# Patient Record
Sex: MALE | Race: WHITE | Employment: OTHER | URBAN - METROPOLITAN AREA
[De-identification: names, ages, dates, MRNs, and addresses within clinical notes are randomized per-mention and may not be internally consistent; named-entity substitution may affect disease eponyms.]

---

## 2021-10-06 ENCOUNTER — OFFICE VISIT (OUTPATIENT)
Dept: INTERNAL MEDICINE CLINIC | Age: 71
End: 2021-10-06
Payer: MEDICARE

## 2021-10-06 VITALS
HEIGHT: 71 IN | SYSTOLIC BLOOD PRESSURE: 130 MMHG | DIASTOLIC BLOOD PRESSURE: 80 MMHG | OXYGEN SATURATION: 97 % | HEART RATE: 77 BPM | WEIGHT: 216.4 LBS | BODY MASS INDEX: 30.3 KG/M2

## 2021-10-06 DIAGNOSIS — N18.30 STAGE 3 CHRONIC KIDNEY DISEASE, UNSPECIFIED WHETHER STAGE 3A OR 3B CKD (HCC): ICD-10-CM

## 2021-10-06 DIAGNOSIS — I10 PRIMARY HYPERTENSION: Primary | ICD-10-CM

## 2021-10-06 DIAGNOSIS — E53.8 B12 DEFICIENCY: ICD-10-CM

## 2021-10-06 DIAGNOSIS — N39.492 POSTURAL URINARY INCONTINENCE: ICD-10-CM

## 2021-10-06 DIAGNOSIS — E55.9 VITAMIN D DEFICIENCY: ICD-10-CM

## 2021-10-06 DIAGNOSIS — E78.00 HYPERCHOLESTEROLEMIA: ICD-10-CM

## 2021-10-06 DIAGNOSIS — Z13.220 SCREENING FOR CHOLESTEROL LEVEL: ICD-10-CM

## 2021-10-06 DIAGNOSIS — I48.21 PERMANENT ATRIAL FIBRILLATION (HCC): ICD-10-CM

## 2021-10-06 DIAGNOSIS — M17.11 PRIMARY OSTEOARTHRITIS OF RIGHT KNEE: ICD-10-CM

## 2021-10-06 DIAGNOSIS — C61 PROSTATE CANCER (HCC): ICD-10-CM

## 2021-10-06 PROBLEM — R26.9 GAIT DISTURBANCE: Status: ACTIVE | Noted: 2018-08-30

## 2021-10-06 PROBLEM — N39.46 MIXED STRESS AND URGE URINARY INCONTINENCE: Status: ACTIVE | Noted: 2018-02-23

## 2021-10-06 PROCEDURE — 3017F COLORECTAL CA SCREEN DOC REV: CPT | Performed by: INTERNAL MEDICINE

## 2021-10-06 PROCEDURE — 1036F TOBACCO NON-USER: CPT | Performed by: INTERNAL MEDICINE

## 2021-10-06 PROCEDURE — G8484 FLU IMMUNIZE NO ADMIN: HCPCS | Performed by: INTERNAL MEDICINE

## 2021-10-06 PROCEDURE — G8427 DOCREV CUR MEDS BY ELIG CLIN: HCPCS | Performed by: INTERNAL MEDICINE

## 2021-10-06 PROCEDURE — 1123F ACP DISCUSS/DSCN MKR DOCD: CPT | Performed by: INTERNAL MEDICINE

## 2021-10-06 PROCEDURE — 99204 OFFICE O/P NEW MOD 45 MIN: CPT | Performed by: INTERNAL MEDICINE

## 2021-10-06 PROCEDURE — G8417 CALC BMI ABV UP PARAM F/U: HCPCS | Performed by: INTERNAL MEDICINE

## 2021-10-06 PROCEDURE — 4040F PNEUMOC VAC/ADMIN/RCVD: CPT | Performed by: INTERNAL MEDICINE

## 2021-10-06 RX ORDER — LOSARTAN POTASSIUM 50 MG/1
50 TABLET ORAL 2 TIMES DAILY
COMMUNITY
Start: 2020-11-05 | End: 2021-10-06 | Stop reason: SDUPTHER

## 2021-10-06 RX ORDER — LORATADINE 10 MG/1
10 TABLET ORAL DAILY PRN
COMMUNITY
End: 2022-04-06

## 2021-10-06 RX ORDER — DILTIAZEM HYDROCHLORIDE 240 MG/1
240 CAPSULE, EXTENDED RELEASE ORAL DAILY
COMMUNITY
Start: 2021-01-20 | End: 2021-10-06 | Stop reason: SDUPTHER

## 2021-10-06 RX ORDER — CHLORTHALIDONE 25 MG/1
25 TABLET ORAL DAILY
COMMUNITY
Start: 2021-01-26 | End: 2021-10-06 | Stop reason: SDUPTHER

## 2021-10-06 RX ORDER — CARVEDILOL 12.5 MG/1
12.5 TABLET ORAL 2 TIMES DAILY WITH MEALS
Qty: 180 TABLET | Refills: 1 | Status: SHIPPED | OUTPATIENT
Start: 2021-10-06 | End: 2022-02-07 | Stop reason: SDUPTHER

## 2021-10-06 RX ORDER — CHLORTHALIDONE 25 MG/1
25 TABLET ORAL DAILY
Qty: 90 TABLET | Refills: 1 | Status: SHIPPED | OUTPATIENT
Start: 2021-10-06 | End: 2022-02-07 | Stop reason: SDUPTHER

## 2021-10-06 RX ORDER — ATORVASTATIN CALCIUM 20 MG/1
20 TABLET, FILM COATED ORAL NIGHTLY
COMMUNITY
Start: 2021-02-25 | End: 2021-10-06 | Stop reason: SDUPTHER

## 2021-10-06 RX ORDER — TOLTERODINE 4 MG/1
CAPSULE, EXTENDED RELEASE ORAL
COMMUNITY
Start: 2021-03-17 | End: 2021-10-06 | Stop reason: SDUPTHER

## 2021-10-06 RX ORDER — TOLTERODINE 4 MG/1
CAPSULE, EXTENDED RELEASE ORAL
Qty: 90 CAPSULE | Refills: 1 | Status: SHIPPED | OUTPATIENT
Start: 2021-10-06 | End: 2022-02-07 | Stop reason: SDUPTHER

## 2021-10-06 RX ORDER — ACETAMINOPHEN 500 MG
1000 TABLET ORAL 2 TIMES DAILY
COMMUNITY

## 2021-10-06 RX ORDER — DILTIAZEM HYDROCHLORIDE 240 MG/1
240 CAPSULE, EXTENDED RELEASE ORAL DAILY
COMMUNITY
End: 2021-10-06

## 2021-10-06 RX ORDER — CARVEDILOL 12.5 MG/1
12.5 TABLET ORAL 2 TIMES DAILY WITH MEALS
COMMUNITY
Start: 2021-01-20 | End: 2021-10-06 | Stop reason: SDUPTHER

## 2021-10-06 RX ORDER — LOSARTAN POTASSIUM 50 MG/1
50 TABLET ORAL 2 TIMES DAILY
Qty: 180 TABLET | Refills: 11 | Status: SHIPPED | OUTPATIENT
Start: 2021-10-06 | End: 2022-02-07 | Stop reason: SDUPTHER

## 2021-10-06 RX ORDER — POTASSIUM CHLORIDE 20 MEQ/1
20 TABLET, EXTENDED RELEASE ORAL 2 TIMES DAILY
COMMUNITY
Start: 2021-09-15 | End: 2022-02-16 | Stop reason: SDUPTHER

## 2021-10-06 RX ORDER — ATORVASTATIN CALCIUM 20 MG/1
20 TABLET, FILM COATED ORAL NIGHTLY
Qty: 90 TABLET | Refills: 1 | Status: SHIPPED | OUTPATIENT
Start: 2021-10-06 | End: 2022-02-07 | Stop reason: SDUPTHER

## 2021-10-06 RX ORDER — DILTIAZEM HYDROCHLORIDE 240 MG/1
240 CAPSULE, EXTENDED RELEASE ORAL DAILY
Qty: 90 CAPSULE | Refills: 1 | Status: SHIPPED | OUTPATIENT
Start: 2021-10-06 | End: 2022-02-07 | Stop reason: SDUPTHER

## 2021-10-06 RX ORDER — UBIDECARENONE 200 MG
200 CAPSULE ORAL DAILY
COMMUNITY

## 2021-10-06 SDOH — ECONOMIC STABILITY: FOOD INSECURITY: WITHIN THE PAST 12 MONTHS, THE FOOD YOU BOUGHT JUST DIDN'T LAST AND YOU DIDN'T HAVE MONEY TO GET MORE.: NEVER TRUE

## 2021-10-06 SDOH — ECONOMIC STABILITY: FOOD INSECURITY: WITHIN THE PAST 12 MONTHS, YOU WORRIED THAT YOUR FOOD WOULD RUN OUT BEFORE YOU GOT MONEY TO BUY MORE.: NEVER TRUE

## 2021-10-06 ASSESSMENT — PATIENT HEALTH QUESTIONNAIRE - PHQ9
SUM OF ALL RESPONSES TO PHQ9 QUESTIONS 1 & 2: 0
SUM OF ALL RESPONSES TO PHQ QUESTIONS 1-9: 0
1. LITTLE INTEREST OR PLEASURE IN DOING THINGS: 0
SUM OF ALL RESPONSES TO PHQ QUESTIONS 1-9: 0
SUM OF ALL RESPONSES TO PHQ QUESTIONS 1-9: 0
2. FEELING DOWN, DEPRESSED OR HOPELESS: 0

## 2021-10-06 ASSESSMENT — ENCOUNTER SYMPTOMS
SINUS PAIN: 0
COLOR CHANGE: 0
WHEEZING: 0
ABDOMINAL PAIN: 0
CHEST TIGHTNESS: 0
COUGH: 0
SHORTNESS OF BREATH: 0
BLOOD IN STOOL: 0
CONSTIPATION: 0
BLURRED VISION: 0

## 2021-10-06 ASSESSMENT — SOCIAL DETERMINANTS OF HEALTH (SDOH): HOW HARD IS IT FOR YOU TO PAY FOR THE VERY BASICS LIKE FOOD, HOUSING, MEDICAL CARE, AND HEATING?: NOT HARD AT ALL

## 2021-10-06 NOTE — PROGRESS NOTES
Tanesha Gonzalez (:  1950) is a 66 y.o. male,New patient, here for evaluation of the following chief complaint(s):  Established New Doctor         ASSESSMENT/PLAN:  1. Primary hypertension  Assessment & Plan:   Patient blood pressure slightly elevated at this point we will get monitor him clinically I think likely tends to do with his increased movement and excitement with his greatest visit we will also check his kidney function and refill his prescriptions today  Orders:  -     TSH without Reflex; Future  -     CBC Auto Differential; Future  -     Comprehensive Metabolic Panel; Future  2. Prostate cancer ([facility] [address]  3. Primary osteoarthritis of right knee  4. Permanent atrial fibrillation ([facility] [address]  5. Postural urinary incontinence  6. Screening for cholesterol level  -     Lipid Panel; Future  7. Vitamin D deficiency  -     VITAMIN D 25 HYDROXY; Future  8. B12 deficiency  -     VITAMIN B12 & FOLATE; Future  9. Hypercholesterolemia  Assessment & Plan: We will check patient with profile refill his prescriptions and continue same medication and we discussed the current parameters  10. Stage 3 chronic kidney disease, unspecified whether stage 3a or 3b CKD ([facility] [address]  Assessment & Plan: We will check patient kidney function test and see if any adjustment in treatment is needed      Return in about 6 months (around 2022) for For Medicare wellness visit. Subjective   SUBJECTIVE/OBJECTIVE:    Lab Review   No results found for: NA, K, CO2, BUN, CREATININE, GLUCOSE, CALCIUM  No results found for: WBC, HGB, HCT, MCV, PLT  No results found for: CHOL, TRIG, HDL, LDLDIRECT    Vitals 10/6/2021 755   SYSTOLIC 670 199   DIASTOLIC 80 88   Pulse - 77   SpO2 - 97   Weight - 216 lb 6.4 oz   Height - 5' 11\"   Body mass index - 30.18 kg/m2       Is here to establish himself    Hypertension  This is a chronic problem. The current episode started more than 1 year ago. The problem is unchanged. The problem is controlled. Pertinent negatives include no anxiety, blurred vision, chest pain, headaches, palpitations, peripheral edema, PND, shortness of breath or sweats. Hyperlipidemia  This is a chronic problem. The current episode started more than 1 year ago. The problem is controlled. Pertinent negatives include no chest pain, myalgias or shortness of breath. Review of Systems   Constitutional: Negative for activity change, appetite change, fatigue and unexpected weight change. HENT: Negative for congestion, ear pain and sinus pain. Eyes: Negative for blurred vision. Respiratory: Negative for cough, chest tightness, shortness of breath and wheezing. Cardiovascular: Negative for chest pain, palpitations and PND. Gastrointestinal: Negative for abdominal pain, blood in stool and constipation. Endocrine: Negative for cold intolerance, heat intolerance and polyuria. Genitourinary: Negative for dysuria, frequency and urgency. Musculoskeletal: Negative for arthralgias and myalgias. Skin: Negative for color change and rash. Neurological: Negative for weakness and headaches. Hematological: Negative for adenopathy. Does not bruise/bleed easily. Psychiatric/Behavioral: Negative for agitation, dysphoric mood and sleep disturbance. Objective   Physical Exam  Vitals and nursing note reviewed. Constitutional:       General: He is not in acute distress. Appearance: Normal appearance. HENT:      Head: Normocephalic and atraumatic. Right Ear: Tympanic membrane normal.      Left Ear: Tympanic membrane normal.      Nose: Nose normal.   Eyes:      Extraocular Movements: Extraocular movements intact. Conjunctiva/sclera: Conjunctivae normal.      Pupils: Pupils are equal, round, and reactive to light. Neck:      Vascular: No carotid bruit. Cardiovascular:      Rate and Rhythm: Normal rate and regular rhythm. Pulses: Normal pulses. Heart sounds: No murmur heard.      Pulmonary: Effort: Pulmonary effort is normal. No respiratory distress. Breath sounds: Normal breath sounds. Abdominal:      General: Abdomen is flat. Bowel sounds are normal. There is no distension. Palpations: Abdomen is soft. Tenderness: There is no abdominal tenderness. Musculoskeletal:         General: No swelling, tenderness or deformity. Cervical back: Normal range of motion and neck supple. No rigidity or tenderness. Right lower leg: No edema. Left lower leg: No edema. Lymphadenopathy:      Cervical: No cervical adenopathy. Skin:     Coloration: Skin is not jaundiced. Findings: No bruising, erythema or lesion. Neurological:      General: No focal deficit present. Mental Status: He is alert and oriented to person, place, and time. Cranial Nerves: No cranial nerve deficit. Motor: No weakness. Gait: Gait normal.            This dictation was generated by voice recognition computer software. Although all attempts are made to edit the dictation for accuracy, there may be errors in the transcription that are not intended. An electronic signature was used to authenticate this note.     --Cara Whatley MD

## 2021-10-06 NOTE — ASSESSMENT & PLAN NOTE
We will check patient with profile refill his prescriptions and continue same medication and we discussed the current parameters

## 2021-10-07 DIAGNOSIS — I48.21 PERMANENT ATRIAL FIBRILLATION (HCC): Primary | ICD-10-CM

## 2021-10-07 DIAGNOSIS — I10 PRIMARY HYPERTENSION: Primary | ICD-10-CM

## 2021-10-27 PROBLEM — E78.5 HYPERLIPIDEMIA: Status: ACTIVE | Noted: 2019-03-21

## 2021-10-27 NOTE — PATIENT INSTRUCTIONS
May consider and A fib ablation if you are interested  No changes today,  Call us if you have any concerns  Follow up 1 year

## 2021-10-27 NOTE — ASSESSMENT & PLAN NOTE
Current Rhythm~ regular sinus  Rate controlled  ChadsVasc score~ 2  Current meds~ eliquis / diltiazem  Plan~ controlled. Discussed ablation. Not interested at this time.

## 2021-10-27 NOTE — ASSESSMENT & PLAN NOTE
/80 (Site: Left Upper Arm, Position: Sitting, Cuff Size: Medium Adult)   Pulse 77   Ht 5' 11\" (1.803 m)   Wt 214 lb (97.1 kg)   SpO2 97%   BMI 29.85 kg/m²   Meds~ coreg / hygroton / losartan   Plan~ well controlled

## 2021-10-27 NOTE — PROGRESS NOTES
[address]   Cardiac Evaluation      Patient: Pam Blancas  YOB: 1950         Chief Complaint   Patient presents with    Hypertension     No complaints     Atrial Fibrillation        Referring provider: Shilpa Metzger MD    History of Present Illness:   Pam Blancas is a 66 y.o. male here to establish care since moving to [place] from [facility]. pmh includes Htn, Afib, Hld, CKD, never smoked. He is a retired nurse. Today he is here to establish since moving. He was being treated for chronic A fib. He reports it is controlled with diltiazem. He can tell when he is in afib. He reports the last time was two weeks ago when he forgot his medication. He took the diltiazem and returned to a normal rhythm. With regard to medication therapy he/she has been compliant with prescribed regimen and has tolerated therapy to date. Past Medical History:   has a past medical history of Permanent atrial fibrillation ([facility] [address] Postural urinary incontinence, Primary hypertension, and Prostate cancer ([facility] [address] Surgical History:   has no past surgical history on file.      Current Outpatient Medications   Medication Sig Dispense Refill    vitamin D 25 MCG (1000 UT) CAPS Take 1,000 Units by mouth daily      coenzyme Q10 200 MG CAPS capsule Take 200 mg by mouth daily      cyanocobalamin 500 MCG tablet Take 1,000 mcg by mouth daily      loratadine (CLARITIN) 10 MG tablet Take 10 mg by mouth daily as needed      potassium chloride (KLOR-CON M) 20 MEQ extended release tablet Take 20 mEq by mouth 2 times daily      acetaminophen (TYLENOL) 500 MG tablet Take 1,000 mg by mouth 2 times daily      apixaban (ELIQUIS) 5 MG TABS tablet Take 1 tablet by mouth 2 times daily 180 tablet 1    atorvastatin (LIPITOR) 20 MG tablet Take 1 tablet by mouth nightly 90 tablet 1    losartan (COZAAR) 50 MG tablet Take 1 tablet by mouth 2 times daily 180 tablet 11    carvedilol (COREG) 12.5 MG tablet Take 1 tablet by mouth 2 times daily (with meals) 180 tablet 1    dilTIAZem (TIAZAC) 240 MG extended release capsule Take 1 capsule by mouth daily 90 capsule 1    chlorthalidone (HYGROTON) 25 MG tablet Take 1 tablet by mouth daily 90 tablet 1    tolterodine (DETROL LA) 4 MG extended release capsule TAKE 1 CAPSULE DAILY 90 capsule 1     No current facility-administered medications for this visit. Allergies:  Lisinopril, Pravachol [pravastatin], and Morphine     Social History:  Social History     Socioeconomic History    Marital status:      Spouse name: Not on file    Number of children: Not on file    Years of education: Not on file    Highest education level: Not on file   Occupational History    Not on file   Tobacco Use    Smoking status: Never Smoker    Smokeless tobacco: Never Used   Substance and Sexual Activity    Alcohol use: Never    Drug use: Never    Sexual activity: Not on file   Other Topics Concern    Not on file   Social History Narrative    Not on file     Social Determinants of Health     Financial Resource Strain: Low Risk     Difficulty of Paying Living Expenses: Not hard at all   Food Insecurity: No Food Insecurity    Worried About [address] in the Last Year: Never true    [facility] in the Last Year: Never true   Transportation Needs:     Lack of Transportation (Medical): Not on file    Lack of Transportation (Non-Medical):  Not on file   Physical Activity:     Days of Exercise per Week: Not on file    Minutes of Exercise per Session: Not on file   Stress:     Feeling of Stress : Not on file   Social Connections:     Frequency of Communication with Friends and Family: Not on file    Frequency of Social Gatherings with Friends and Family: Not on file    Attends Taoism Services: Not on file    Active Member of Clubs or Organizations: Not on file    Attends Club or Organization Meetings: Not on file    Marital Status: Not on file   Intimate Partner Violence:     Fear of Current or Ex-Partner: Not on file    Emotionally Abused: Not on file    Physically Abused: Not on file    Sexually Abused: Not on file   Housing Stability:     Unable to Pay for Housing in the Last Year: Not on file    Number of Places Lived in the Last Year: Not on file    Unstable Housing in the Last Year: Not on file       Family History:   History reviewed. No pertinent family history. Family history has been reviewed and not pertinent except as noted above. Review of Systems:   · Constitutional: there has been no unanticipated weight loss. No change in energy or activity level   · Eyes: No visual changes   · ENT: No Headaches, hearing loss or vertigo. No mouth sores or sore throat. · Cardiovascular: Reviewed in HPI  · Respiratory: No cough or wheezing, no sputum production. · Gastrointestinal: No abdominal pain, appetite loss, blood in stools. No change in bowel or bladder habits. · Genitourinary: No nocturia, dysuria, trouble voiding  · Musculoskeletal:  No gait disturbance, weakness or joint complaints. · Integumentary: No rash or pruritis. · Neurological: No headache, change in muscle strength, numbness or tingling. No change in gait, balance, coordination, mood, affect, memory, mentation, behavior. · Psychiatric: No anxiety or depression  · Endocrine: No malaise or fever  · Hematologic/Lymphatic: No abnormal bruising or bleeding, blood clots or swollen lymph nodes. · Allergic/Immunologic: No nasal congestion or hives. Physical Examination:    Vitals:    11/11/21 1329   BP: 138/80   Site: Left Upper Arm   Position: Sitting   Cuff Size: Medium Adult   Pulse: 77   SpO2: 97%   Weight: 214 lb (97.1 kg)   Height: 5' 11\" (1.803 m)     Body mass index is 29.85 kg/m².      Wt Readings from Last 3 Encounters:   11/11/21 214 lb (97.1 kg)   10/06/21 216 lb 6.4 oz (98.2 kg)      BP Readings from Last 3 Encounters:   11/11/21 138/80   10/06/21 130/80        Physical documentation, as scribed by the above signed scribe in my presence. It is both accurate and complete to my knowledge. I agree with the details independently gathered by the clinical support staff, while the remaining scribed note accurately describes my personal service to the patient.

## 2021-10-29 ENCOUNTER — TELEPHONE (OUTPATIENT)
Dept: CARDIOLOGY CLINIC | Age: 71
End: 2021-10-29

## 2021-10-29 NOTE — TELEPHONE ENCOUNTER
----- Message from Mariel Cohen RN sent at 10/29/2021  9:51 AM EDT -----  Please see below message. We need to get medical records before office visit if possible. thanks  ----- Message -----  From: Mariel Cohen RN  Sent: 10/27/2021   1:08 PM EDT  To: Belle Hernandez    Can you please get in touch with [facility] [facility] to see if you can get this patients medical records? He just moved here and has an ov 11/11/21.  Thank you

## 2021-10-29 NOTE — TELEPHONE ENCOUNTER
Tried to call and get medical records and they stated that there is no one in office to do so because of COVID. She stated that we would have to call back at a later date.

## 2021-11-11 ENCOUNTER — OFFICE VISIT (OUTPATIENT)
Dept: CARDIOLOGY CLINIC | Age: 71
End: 2021-11-11
Payer: MEDICARE

## 2021-11-11 VITALS
OXYGEN SATURATION: 97 % | DIASTOLIC BLOOD PRESSURE: 80 MMHG | HEIGHT: 71 IN | WEIGHT: 214 LBS | BODY MASS INDEX: 29.96 KG/M2 | SYSTOLIC BLOOD PRESSURE: 138 MMHG | HEART RATE: 77 BPM

## 2021-11-11 DIAGNOSIS — E78.5 HYPERLIPIDEMIA, UNSPECIFIED HYPERLIPIDEMIA TYPE: ICD-10-CM

## 2021-11-11 DIAGNOSIS — I48.0 PAROXYSMAL ATRIAL FIBRILLATION (HCC): Primary | ICD-10-CM

## 2021-11-11 DIAGNOSIS — I10 ESSENTIAL HYPERTENSION: ICD-10-CM

## 2021-11-11 PROCEDURE — 99204 OFFICE O/P NEW MOD 45 MIN: CPT | Performed by: INTERNAL MEDICINE

## 2021-11-11 PROCEDURE — G8484 FLU IMMUNIZE NO ADMIN: HCPCS | Performed by: INTERNAL MEDICINE

## 2021-11-11 PROCEDURE — 93000 ELECTROCARDIOGRAM COMPLETE: CPT | Performed by: INTERNAL MEDICINE

## 2021-11-11 PROCEDURE — G8427 DOCREV CUR MEDS BY ELIG CLIN: HCPCS | Performed by: INTERNAL MEDICINE

## 2021-11-11 PROCEDURE — G8417 CALC BMI ABV UP PARAM F/U: HCPCS | Performed by: INTERNAL MEDICINE

## 2022-02-07 ENCOUNTER — PATIENT MESSAGE (OUTPATIENT)
Dept: INTERNAL MEDICINE CLINIC | Age: 72
End: 2022-02-07

## 2022-02-07 RX ORDER — ATORVASTATIN CALCIUM 20 MG/1
20 TABLET, FILM COATED ORAL NIGHTLY
Qty: 90 TABLET | Refills: 1 | Status: SHIPPED | OUTPATIENT
Start: 2022-02-07 | End: 2022-07-05

## 2022-02-07 RX ORDER — CARVEDILOL 12.5 MG/1
12.5 TABLET ORAL 2 TIMES DAILY WITH MEALS
Qty: 180 TABLET | Refills: 1 | Status: SHIPPED | OUTPATIENT
Start: 2022-02-07 | End: 2022-07-05

## 2022-02-07 RX ORDER — DILTIAZEM HYDROCHLORIDE 240 MG/1
240 CAPSULE, EXTENDED RELEASE ORAL DAILY
Qty: 90 CAPSULE | Refills: 1 | Status: SHIPPED | OUTPATIENT
Start: 2022-02-07 | End: 2022-03-17

## 2022-02-07 RX ORDER — CHLORTHALIDONE 25 MG/1
25 TABLET ORAL DAILY
Qty: 90 TABLET | Refills: 1 | Status: SHIPPED | OUTPATIENT
Start: 2022-02-07 | End: 2022-07-01 | Stop reason: SDUPTHER

## 2022-02-07 RX ORDER — TOLTERODINE 4 MG/1
CAPSULE, EXTENDED RELEASE ORAL
Qty: 90 CAPSULE | Refills: 1 | Status: SHIPPED | OUTPATIENT
Start: 2022-02-07 | End: 2022-03-01 | Stop reason: SDUPTHER

## 2022-02-07 RX ORDER — LOSARTAN POTASSIUM 50 MG/1
50 TABLET ORAL 2 TIMES DAILY
Qty: 180 TABLET | Refills: 11 | Status: SHIPPED | OUTPATIENT
Start: 2022-02-07 | End: 2023-02-07

## 2022-02-07 NOTE — TELEPHONE ENCOUNTER
From: Anne Kevin  To: Dr. Lugo Jori: 2/7/2022 10:01 AM EST  Subject: Medications refill    Jean Lancaster and I are getting our prescribed medications through [facility]. They have given you their fax number, or you can contact them directly at [phone], so we can reciieve our medications.   Thanks

## 2022-02-15 ENCOUNTER — PATIENT MESSAGE (OUTPATIENT)
Dept: INTERNAL MEDICINE CLINIC | Age: 72
End: 2022-02-15

## 2022-02-16 RX ORDER — POTASSIUM CHLORIDE 20 MEQ/1
20 TABLET, EXTENDED RELEASE ORAL 2 TIMES DAILY
Qty: 180 TABLET | Refills: 1 | Status: SHIPPED | OUTPATIENT
Start: 2022-02-16 | End: 2022-07-05

## 2022-03-01 RX ORDER — TOLTERODINE 4 MG/1
CAPSULE, EXTENDED RELEASE ORAL
Qty: 90 CAPSULE | Refills: 1 | Status: SHIPPED | OUTPATIENT
Start: 2022-03-01 | End: 2022-09-19 | Stop reason: SDUPTHER

## 2022-03-17 RX ORDER — DILTIAZEM HYDROCHLORIDE 240 MG/1
CAPSULE, EXTENDED RELEASE ORAL
Qty: 90 CAPSULE | Refills: 3 | Status: SHIPPED | OUTPATIENT
Start: 2022-03-17 | End: 2022-07-05

## 2022-04-06 ENCOUNTER — OFFICE VISIT (OUTPATIENT)
Dept: INTERNAL MEDICINE CLINIC | Age: 72
End: 2022-04-06
Payer: MEDICARE

## 2022-04-06 VITALS
HEART RATE: 80 BPM | OXYGEN SATURATION: 99 % | HEIGHT: 71 IN | BODY MASS INDEX: 29.37 KG/M2 | DIASTOLIC BLOOD PRESSURE: 76 MMHG | WEIGHT: 209.8 LBS | SYSTOLIC BLOOD PRESSURE: 134 MMHG

## 2022-04-06 DIAGNOSIS — Z13.220 SCREENING FOR CHOLESTEROL LEVEL: ICD-10-CM

## 2022-04-06 DIAGNOSIS — E78.2 MIXED HYPERLIPIDEMIA: ICD-10-CM

## 2022-04-06 DIAGNOSIS — Z00.00 INITIAL MEDICARE ANNUAL WELLNESS VISIT: ICD-10-CM

## 2022-04-06 DIAGNOSIS — N18.30 STAGE 3 CHRONIC KIDNEY DISEASE, UNSPECIFIED WHETHER STAGE 3A OR 3B CKD (HCC): ICD-10-CM

## 2022-04-06 DIAGNOSIS — C61 PROSTATE CANCER (HCC): ICD-10-CM

## 2022-04-06 DIAGNOSIS — Z00.00 PREVENTATIVE HEALTH CARE: Primary | ICD-10-CM

## 2022-04-06 DIAGNOSIS — I48.0 PAROXYSMAL ATRIAL FIBRILLATION (HCC): ICD-10-CM

## 2022-04-06 PROCEDURE — G0439 PPPS, SUBSEQ VISIT: HCPCS | Performed by: INTERNAL MEDICINE

## 2022-04-06 ASSESSMENT — PATIENT HEALTH QUESTIONNAIRE - PHQ9
SUM OF ALL RESPONSES TO PHQ9 QUESTIONS 1 & 2: 0
SUM OF ALL RESPONSES TO PHQ QUESTIONS 1-9: 0
SUM OF ALL RESPONSES TO PHQ QUESTIONS 1-9: 0
2. FEELING DOWN, DEPRESSED OR HOPELESS: 0
SUM OF ALL RESPONSES TO PHQ QUESTIONS 1-9: 0
SUM OF ALL RESPONSES TO PHQ QUESTIONS 1-9: 0
1. LITTLE INTEREST OR PLEASURE IN DOING THINGS: 0

## 2022-04-06 ASSESSMENT — LIFESTYLE VARIABLES: HOW OFTEN DO YOU HAVE A DRINK CONTAINING ALCOHOL: NEVER

## 2022-04-06 NOTE — PROGRESS NOTES
Medicare Annual Wellness Visit  Name: Juan Boyer Date: 2022   MRN: [id number] Sex: Male   Age: 67 y.o. Ethnicity: Non- / Non    : 1950 Race: White (non-)      Bri Eduardo is here for Annual Exam    Screenings for behavioral, psychosocial and functional/safety risks, and cognitive dysfunction are all negative except as indicated below. These results, as well as other patient data from the [address] [facility] [address] form, are documented in Flowsheets linked to this Encounter. Allergies   Allergen Reactions    Enoxaparin Other (See Comments)     Causes clotting    Lisinopril     Pravachol [Pravastatin]     Tuberculin Ppd Other (See Comments)     Redness and swelling of arm, negative chest xrays  Redness and swelling of arm, negative chest xrays      Morphine Nausea And Vomiting         Prior to Visit Medications    Medication Sig Taking?  Authorizing Provider   dilTIAZem (TIAZAC) 240 MG extended release capsule TAKE 1 CAPSULE DAILY Yes Radha Loving APRN - CNP   tolterodine (DETROL LA) 4 MG extended release capsule TAKE 1 CAPSULE DAILY Yes Olga Foster MD   potassium chloride (KLOR-CON M) 20 MEQ extended release tablet Take 1 tablet by mouth 2 times daily Yes Olga Foster MD   losartan (COZAAR) 50 MG tablet Take 1 tablet by mouth 2 times daily Yes Olga Foster MD   carvedilol (COREG) 12.5 MG tablet Take 1 tablet by mouth 2 times daily (with meals) Yes Olga Foster MD   chlorthalidone (HYGROTON) 25 MG tablet Take 1 tablet by mouth daily Yes Olga Foster MD   vitamin D 25 MCG (1000 UT) CAPS Take 1,000 Units by mouth daily Yes Historical Provider, MD   coenzyme Q10 200 MG CAPS capsule Take 200 mg by mouth daily Yes Historical Provider, MD   cyanocobalamin 500 MCG tablet Take 1,000 mcg by mouth daily Yes Historical Provider, MD   apixaban (ELIQUIS) 5 MG TABS tablet Take 1 tablet by mouth 2 times daily  Olga Foster MD   atorvastatin (LIPITOR) 20 MG tablet Take 1 tablet by mouth nightly  Shelli Jeffries MD   acetaminophen (TYLENOL) 500 MG tablet Take 1,000 mg by mouth 2 times daily  Historical Provider, MD         Past Medical History:   Diagnosis Date    Permanent atrial fibrillation ([facility] [address] 10/6/2021    Postural urinary incontinence 10/6/2021    Status post prostatectomy patient has a device that is controlling it    Primary hypertension 10/6/2021    Prostate cancer ([address] 10/6/2021       No past surgical history on file. No family history on file. CareTeam (Including outside providers/suppliers regularly involved in providing care):   Patient Care Team:  Shelli Jeffries MD as PCP - General (Internal Medicine)  Shelli Jeffries MD as PCP - [facility]    Wt Readings from Last 3 Encounters:   04/06/22 209 lb 12.8 oz (95.2 kg)   11/11/21 214 lb (97.1 kg)   10/06/21 216 lb 6.4 oz (98.2 kg)     Vitals:    04/06/22 0942   BP: 134/76   Pulse: 80   SpO2: 99%   Weight: 209 lb 12.8 oz (95.2 kg)   Height: 5' 11\" (1.803 m)     Body mass index is 29.26 kg/m². Based upon direct observation of the patient, evaluation of cognition reveals recent and remote memory intact.     General Appearance: alert and oriented to person, place and time, well developed and well- nourished, in no acute distress  Skin: warm and dry, no rash or erythema  Head: normocephalic and atraumatic  Eyes: pupils equal, round, and reactive to light, extraocular eye movements intact, conjunctivae normal  ENT: tympanic membrane, external ear and ear canal normal bilaterally, nose without deformity, nasal mucosa and turbinates normal without polyps  Neck: supple and non-tender without mass, no thyromegaly or thyroid nodules, no cervical lymphadenopathy  Pulmonary/Chest: clear to auscultation bilaterally- no wheezes, rales or rhonchi, normal air movement, no respiratory distress  Cardiovascular: normal rate, regular rhythm, normal S1 and S2, no murmurs, rubs, clicks, or gallops, distal pulses intact, no carotid bruits  Abdomen: soft, non-tender, non-distended, normal bowel sounds, no masses or organomegaly  Extremities: no cyanosis, clubbing or edema  Musculoskeletal: normal range of motion, no joint swelling, deformity or tenderness  Neurologic: reflexes normal and symmetric, no cranial nerve deficit, gait, coordination and speech normal    Patient's complete Health Risk Assessment and screening values have been reviewed and are found in Flowsheets. The following problems were reviewed today and where indicated follow up appointments were made and/or referrals ordered.     Positive Risk Factor Screenings with Interventions:            General Health and ACP:  General  In general, how would you say your health is?: Excellent  In the past 7 days, have you experienced any of the following: New or Increased Pain, New or Increased Fatigue, Loneliness, Social Isolation, Stress or Anger?: No  Do you get the social and emotional support that you need?: (!) No  Do you have a Living Will?: Yes    Advance Directives     Power of  Living Will ACP-Advance Directive ACP-Power of     Not on File Not on File Not on File Not on File      General Health Risk Interventions:  · none      Safety:  Do you have working smoke detectors?: Yes  Do you have any tripping hazards - loose or unsecured carpets or rugs?: No  Do you have any tripping hazards - clutter in doorways, halls, or stairs?: No  Do you have either shower bars, grab bars, non-slip mats or non-slip surfaces in your shower or bathtub?: (!) No  Do all of your stairways have a railing or banister?: (!) No  Do you always fasten your seatbelt when you are in a car?: (!) No     Personalized Preventive Plan   Current Health Maintenance Status  Immunization History   Administered Date(s) Administered    COVID-19, Pfizer Purple top, DILUTE for use, 12+ yrs, 30mcg/0.3mL dose 04/08/2021, 05/03/2021    Influenza, High Dose (Fluzone 65 yrs and older) 10/08/2019    Influenza, High-dose, Quadv, 65 yrs +, IM (Fluzone) 10/06/2020    Pneumococcal Conjugate 13-valent ([id number]) 02/01/2015    Pneumococcal Polysaccharide ([id number]) 02/01/2015    Tdap (Boostrix, Adacel) 01/01/2012        Health Maintenance   Topic Date Due    Hepatitis C screen  Never done    Shingles Vaccine (1 of 2) Never done    PSA counseling  Never done    Pneumococcal 65+ years Vaccine (2 of 2 - PPSV23) 02/01/2020    Annual Wellness Visit (AWV)  Never done    COVID-19 Vaccine (3 - Booster for Pfizer series) 10/03/2021    DTaP/Tdap/Td vaccine (2 - Td or Tdap) 01/01/2022    Flu vaccine (Season Ended) 09/01/2022    Lipid screen  10/06/2022    Depression Screen  10/06/2022    Potassium monitoring  10/12/2022    Creatinine monitoring  10/12/2022    Colorectal Cancer Screen  02/21/2024    Hepatitis A vaccine  Aged Out    Hepatitis B vaccine  Aged Out    Hib vaccine  Aged Out    Meningococcal (ACWY) vaccine  Aged Out     Recommendations for [employer] Due: see orders and patient instructions/AVS.  . Recommended screening schedule for the next 5-10 years is provided to the patient in written form: see Patient Instructions/AVS.    Preventative health care  -     CBC with Auto Differential; Future  -     Comprehensive Metabolic Panel; Future  Screening for cholesterol level  -     Lipid Panel; Future  Prostate cancer (HCC)  -     PSA, Prostatic Specific Antigen; Future  Paroxysmal atrial fibrillation (HCC)  Stage 3 chronic kidney disease, unspecified whether stage 3a or 3b CKD ([facility] [address]  Mixed hyperlipidemia  Initial Medicare annual wellness visit                Medicare Annual Wellness Visit    Jayant Law is here for Annual Exam    Assessment & Plan   Preventative health care  -     CBC with Auto Differential; Future  -     Comprehensive Metabolic Panel; Future  Screening for cholesterol level  -     Lipid Panel;  Future  Prostate cancer (HCC)  -     PSA, Prostatic Specific Antigen; Future  Paroxysmal atrial fibrillation (HCC)  Stage 3 chronic kidney disease, unspecified whether stage 3a or 3b CKD ([facility] [address]  Mixed hyperlipidemia  Initial Medicare annual wellness visit      Recommendations for Preventive Services Due: see orders and patient instructions/AVS.  Recommended screening schedule for the next 5-10 years is provided to the patient in written form: see Patient Instructions/AVS.     Return for Medicare Annual Wellness Visit in 1 year. Subjective   Patient is doing fairly well he has not had any complaints did not have any new problems    Patient's complete Health Risk Assessment and screening values have been reviewed and are found in Flowsheets. The following problems were reviewed today and where indicated follow up appointments were made and/or referrals ordered.     Positive Risk Factor Screenings with Interventions:               General Health and ACP:  General  In general, how would you say your health is?: Excellent  In the past 7 days, have you experienced any of the following: New or Increased Pain, New or Increased Fatigue, Loneliness, Social Isolation, Stress or Anger?: No  Do you get the social and emotional support that you need?: (!) No  Do you have a Living Will?: Yes    Advance Directives     Power of  Living Will ACP-Advance Directive ACP-Power of     Not on File Not on File Not on File Not on File      General Health Risk Interventions:  · none      Safety:  Do you have working smoke detectors?: Yes  Do you have any tripping hazards - loose or unsecured carpets or rugs?: No  Do you have any tripping hazards - clutter in doorways, halls, or stairs?: No  Do you have either shower bars, grab bars, non-slip mats or non-slip surfaces in your shower or bathtub?: (!) No  Do all of your stairways have a railing or banister?: (!) No  Do you always fasten your seatbelt when you are in a car?: (!) No    Safety Interventions:  · Home safety tips provided           Objective   Vitals:    04/06/22 0942   BP: 134/76   Pulse: 80   SpO2: 99%   Weight: 209 lb 12.8 oz (95.2 kg)   Height: 5' 11\" (1.803 m)      Body mass index is 29.26 kg/m². General Appearance: alert and oriented to person, place and time, well developed and well- nourished, in no acute distress  Skin: warm and dry, no rash or erythema  Head: normocephalic and atraumatic  Eyes: pupils equal, round, and reactive to light, extraocular eye movements intact, conjunctivae normal  ENT: tympanic membrane, external ear and ear canal normal bilaterally, nose without deformity, nasal mucosa and turbinates normal without polyps  Neck: supple and non-tender without mass, no thyromegaly or thyroid nodules, no cervical lymphadenopathy  Pulmonary/Chest: clear to auscultation bilaterally- no wheezes, rales or rhonchi, normal air movement, no respiratory distress  Cardiovascular: normal rate, regular rhythm, normal S1 and S2, no murmurs, rubs, clicks, or gallops, distal pulses intact, no carotid bruits  Abdomen: soft, non-tender, non-distended, normal bowel sounds, no masses or organomegaly  Extremities: no cyanosis, clubbing or edema  Musculoskeletal: normal range of motion, no joint swelling, deformity or tenderness  Neurologic: reflexes normal and symmetric, no cranial nerve deficit, gait, coordination and speech normal       Allergies   Allergen Reactions    Enoxaparin Other (See Comments)     Causes clotting    Lisinopril     Pravachol [Pravastatin]     Tuberculin Ppd Other (See Comments)     Redness and swelling of arm, negative chest xrays  Redness and swelling of arm, negative chest xrays      Morphine Nausea And Vomiting     Prior to Visit Medications    Medication Sig Taking?  Authorizing Provider   dilTIAZem (TIAZAC) 240 MG extended release capsule TAKE 1 CAPSULE DAILY Yes Benigno , APRN - CNP   tolterodine (DETROL LA) 4 MG extended release capsule TAKE 1 CAPSULE DAILY Yes Isis Bee I Alison Lyn MD   potassium chloride (KLOR-CON M) 20 MEQ extended release tablet Take 1 tablet by mouth 2 times daily Yes Steffany Chavira MD   losartan (COZAAR) 50 MG tablet Take 1 tablet by mouth 2 times daily Yes Steffany Chavira MD   carvedilol (COREG) 12.5 MG tablet Take 1 tablet by mouth 2 times daily (with meals) Yes Steffany Chavira MD   chlorthalidone (HYGROTON) 25 MG tablet Take 1 tablet by mouth daily Yes Steffany Chavira MD   vitamin D 25 MCG (1000 UT) CAPS Take 1,000 Units by mouth daily Yes Historical Provider, MD   coenzyme Q10 200 MG CAPS capsule Take 200 mg by mouth daily Yes Historical Provider, MD   cyanocobalamin 500 MCG tablet Take 1,000 mcg by mouth daily Yes Historical Provider, MD   apixaban (ELIQUIS) 5 MG TABS tablet Take 1 tablet by mouth 2 times daily  Steffany Chavira MD   atorvastatin (LIPITOR) 20 MG tablet Take 1 tablet by mouth nightly  Steffany Chavira MD   acetaminophen (TYLENOL) 500 MG tablet Take 1,000 mg by mouth 2 times daily  Historical Provider, MD [place] (Including outside providers/suppliers regularly involved in providing care):   Patient Care Team:  Steffany Chavira MD as PCP - General (Internal Medicine)  Steffany Chavira MD [facility]    Reviewed and updated this visit:  Allergies  Meds

## 2022-04-06 NOTE — PATIENT INSTRUCTIONS
Personalized Preventive Plan for Tony Nielsen - 4/6/2022  Medicare offers a range of preventive health benefits. Some of the tests and screenings are paid in full while other may be subject to a deductible, co-insurance, and/or copay. Some of these benefits include a comprehensive review of your medical history including lifestyle, illnesses that may run in your family, and various assessments and screenings as appropriate. After reviewing your medical record and screening and assessments performed today your provider may have ordered immunizations, labs, imaging, and/or referrals for you. A list of these orders (if applicable) as well as your Preventive Care list are included within your After Visit Summary for your review. Other Preventive Recommendations:    · A preventive eye exam performed by an eye specialist is recommended every 1-2 years to screen for glaucoma; cataracts, macular degeneration, and other eye disorders. · A preventive dental visit is recommended every 6 months. · Try to get at least 150 minutes of exercise per week or 10,000 steps per day on a pedometer . · Order or download the FREE \"Exercise & Physical Activity: Your Everyday Guide\" from [employer]. Call 6-790.585.5577 or search [employer] online. · You need 3754-2129 mg of calcium and 8343-6479 IU of vitamin D per day. It is possible to meet your calcium requirement with diet alone, but a vitamin D supplement is usually necessary to meet this goal.  · When exposed to the sun, use a sunscreen that protects against both UVA and UVB radiation with an SPF of 30 or greater. Reapply every 2 to 3 hours or after sweating, drying off with a towel, or swimming. · Always wear a seat belt when traveling in a car. Always wear a helmet when riding a bicycle or motorcycle.

## 2022-04-07 DIAGNOSIS — C61 PROSTATE CANCER (HCC): ICD-10-CM

## 2022-04-07 DIAGNOSIS — Z13.220 SCREENING FOR CHOLESTEROL LEVEL: ICD-10-CM

## 2022-04-07 DIAGNOSIS — E53.8 B12 DEFICIENCY: Primary | ICD-10-CM

## 2022-04-07 DIAGNOSIS — Z00.00 PREVENTATIVE HEALTH CARE: ICD-10-CM

## 2022-04-07 LAB
A/G RATIO: 1.5 (ref 1.1–2.2)
ALBUMIN SERPL-MCNC: 4.3 G/DL (ref 3.4–5)
ALP BLD-CCNC: 111 U/L (ref 40–129)
ALT SERPL-CCNC: 18 U/L (ref 10–40)
ANION GAP SERPL CALCULATED.3IONS-SCNC: 13 MMOL/L (ref 3–16)
AST SERPL-CCNC: 16 U/L (ref 15–37)
BASOPHILS ABSOLUTE: 0 K/UL (ref 0–0.2)
BASOPHILS RELATIVE PERCENT: 0.6 %
BILIRUB SERPL-MCNC: 0.5 MG/DL (ref 0–1)
BUN BLDV-MCNC: 28 MG/DL (ref 7–20)
CALCIUM SERPL-MCNC: 9.4 MG/DL (ref 8.3–10.6)
CHLORIDE BLD-SCNC: 105 MMOL/L (ref 99–110)
CHOLESTEROL, TOTAL: 130 MG/DL (ref 0–199)
CO2: 22 MMOL/L (ref 21–32)
CREAT SERPL-MCNC: 1.2 MG/DL (ref 0.8–1.3)
EOSINOPHILS ABSOLUTE: 0.3 K/UL (ref 0–0.6)
EOSINOPHILS RELATIVE PERCENT: 4.3 %
GFR AFRICAN AMERICAN: >60
GFR NON-AFRICAN AMERICAN: 59
GLUCOSE BLD-MCNC: 88 MG/DL (ref 70–99)
HCT VFR BLD CALC: 39.9 % (ref 40.5–52.5)
HDLC SERPL-MCNC: 35 MG/DL (ref 40–60)
HEMOGLOBIN: 13.2 G/DL (ref 13.5–17.5)
LDL CHOLESTEROL CALCULATED: 75 MG/DL
LYMPHOCYTES ABSOLUTE: 1.5 K/UL (ref 1–5.1)
LYMPHOCYTES RELATIVE PERCENT: 23.5 %
MCH RBC QN AUTO: 30.1 PG (ref 26–34)
MCHC RBC AUTO-ENTMCNC: 33.2 G/DL (ref 31–36)
MCV RBC AUTO: 90.7 FL (ref 80–100)
MONOCYTES ABSOLUTE: 0.5 K/UL (ref 0–1.3)
MONOCYTES RELATIVE PERCENT: 8 %
NEUTROPHILS ABSOLUTE: 4.1 K/UL (ref 1.7–7.7)
NEUTROPHILS RELATIVE PERCENT: 63.6 %
PDW BLD-RTO: 14.4 % (ref 12.4–15.4)
PLATELET # BLD: 293 K/UL (ref 135–450)
PMV BLD AUTO: 7.8 FL (ref 5–10.5)
POTASSIUM SERPL-SCNC: 4.5 MMOL/L (ref 3.5–5.1)
PROSTATE SPECIFIC ANTIGEN: <0.01 NG/ML (ref 0–4)
RBC # BLD: 4.4 M/UL (ref 4.2–5.9)
SODIUM BLD-SCNC: 140 MMOL/L (ref 136–145)
TOTAL PROTEIN: 7.2 G/DL (ref 6.4–8.2)
TRIGL SERPL-MCNC: 101 MG/DL (ref 0–150)
VLDLC SERPL CALC-MCNC: 20 MG/DL
WBC # BLD: 6.4 K/UL (ref 4–11)

## 2022-04-08 ENCOUNTER — TELEPHONE (OUTPATIENT)
Dept: INTERNAL MEDICINE CLINIC | Age: 72
End: 2022-04-08

## 2022-04-19 DIAGNOSIS — E53.8 B12 DEFICIENCY: ICD-10-CM

## 2022-04-19 LAB
BASOPHILS ABSOLUTE: 0.1 K/UL (ref 0–0.2)
BASOPHILS RELATIVE PERCENT: 0.9 %
EOSINOPHILS ABSOLUTE: 0.3 K/UL (ref 0–0.6)
EOSINOPHILS RELATIVE PERCENT: 5.1 %
HCT VFR BLD CALC: 39.3 % (ref 40.5–52.5)
HEMOGLOBIN: 13.2 G/DL (ref 13.5–17.5)
LYMPHOCYTES ABSOLUTE: 1.5 K/UL (ref 1–5.1)
LYMPHOCYTES RELATIVE PERCENT: 21.6 %
MCH RBC QN AUTO: 30.4 PG (ref 26–34)
MCHC RBC AUTO-ENTMCNC: 33.5 G/DL (ref 31–36)
MCV RBC AUTO: 90.7 FL (ref 80–100)
MONOCYTES ABSOLUTE: 0.7 K/UL (ref 0–1.3)
MONOCYTES RELATIVE PERCENT: 10.9 %
NEUTROPHILS ABSOLUTE: 4.2 K/UL (ref 1.7–7.7)
NEUTROPHILS RELATIVE PERCENT: 61.5 %
PDW BLD-RTO: 14.5 % (ref 12.4–15.4)
PLATELET # BLD: 261 K/UL (ref 135–450)
PMV BLD AUTO: 8.2 FL (ref 5–10.5)
RBC # BLD: 4.33 M/UL (ref 4.2–5.9)
WBC # BLD: 6.8 K/UL (ref 4–11)

## 2022-07-01 ENCOUNTER — PATIENT MESSAGE (OUTPATIENT)
Dept: INTERNAL MEDICINE CLINIC | Age: 72
End: 2022-07-01

## 2022-07-01 RX ORDER — CHLORTHALIDONE 25 MG/1
25 TABLET ORAL DAILY
Qty: 30 TABLET | Refills: 0 | Status: SHIPPED | OUTPATIENT
Start: 2022-07-01 | End: 2022-09-19 | Stop reason: SDUPTHER

## 2022-07-01 NOTE — TELEPHONE ENCOUNTER
From: Viv Castillo  To: Dr. Edith Pichardo: 7/1/2022 10:07 AM EDT  Subject: Chlorthalidone    I was supposed to receive a refill on 6/27/22. I have only 4 tablets left, and am not sure I will receive my refill before I run out. Would you consider a bridge prescription so that I dont run out before my Alois Almaz Rx arrives? ([facility], [address])  According to their website, I have two refills available. Thanks for your consideration, I will message you should my prescription arrive.

## 2022-07-05 RX ORDER — ATORVASTATIN CALCIUM 20 MG/1
TABLET, FILM COATED ORAL
Qty: 90 TABLET | Refills: 1 | Status: SHIPPED | OUTPATIENT
Start: 2022-07-05

## 2022-07-05 RX ORDER — APIXABAN 5 MG/1
TABLET, FILM COATED ORAL
Qty: 180 TABLET | Refills: 1 | Status: SHIPPED | OUTPATIENT
Start: 2022-07-05

## 2022-07-05 RX ORDER — DILTIAZEM HYDROCHLORIDE 240 MG/1
CAPSULE, EXTENDED RELEASE ORAL
Qty: 90 CAPSULE | Refills: 1 | Status: SHIPPED | OUTPATIENT
Start: 2022-07-05

## 2022-07-05 RX ORDER — POTASSIUM CHLORIDE 20 MEQ/1
TABLET, EXTENDED RELEASE ORAL
Qty: 180 TABLET | Refills: 1 | Status: SHIPPED | OUTPATIENT
Start: 2022-07-05

## 2022-07-05 RX ORDER — CARVEDILOL 12.5 MG/1
TABLET ORAL
Qty: 180 TABLET | Refills: 1 | Status: SHIPPED | OUTPATIENT
Start: 2022-07-05

## 2022-08-23 ENCOUNTER — OFFICE VISIT (OUTPATIENT)
Dept: INTERNAL MEDICINE CLINIC | Age: 72
End: 2022-08-23
Payer: MEDICARE

## 2022-08-23 VITALS
SYSTOLIC BLOOD PRESSURE: 135 MMHG | WEIGHT: 215.6 LBS | HEIGHT: 71 IN | OXYGEN SATURATION: 99 % | HEART RATE: 85 BPM | BODY MASS INDEX: 30.18 KG/M2 | DIASTOLIC BLOOD PRESSURE: 79 MMHG

## 2022-08-23 DIAGNOSIS — R31.0 GROSS HEMATURIA: Primary | ICD-10-CM

## 2022-08-23 DIAGNOSIS — C61 PROSTATE CANCER (HCC): ICD-10-CM

## 2022-08-23 DIAGNOSIS — R31.0 GROSS HEMATURIA: ICD-10-CM

## 2022-08-23 DIAGNOSIS — I10 ESSENTIAL HYPERTENSION: ICD-10-CM

## 2022-08-23 LAB
BILIRUBIN, POC: NEGATIVE
BLOOD URINE, POC: ABNORMAL
CLARITY, POC: ABNORMAL
COLOR, POC: YELLOW
GLUCOSE URINE, POC: NEGATIVE
KETONES, POC: NEGATIVE
LEUKOCYTE EST, POC: NEGATIVE
NITRITE, POC: NEGATIVE
PH, POC: 5.5
PROSTATE SPECIFIC ANTIGEN: <0.01 NG/ML (ref 0–4)
PROTEIN, POC: NEGATIVE
SPECIFIC GRAVITY, POC: 1.03
UROBILINOGEN, POC: 0.2

## 2022-08-23 PROCEDURE — 99213 OFFICE O/P EST LOW 20 MIN: CPT | Performed by: INTERNAL MEDICINE

## 2022-08-23 PROCEDURE — 81002 URINALYSIS NONAUTO W/O SCOPE: CPT | Performed by: INTERNAL MEDICINE

## 2022-08-23 PROCEDURE — 1123F ACP DISCUSS/DSCN MKR DOCD: CPT | Performed by: INTERNAL MEDICINE

## 2022-08-23 NOTE — PROGRESS NOTES
HCT 39.9 04/07/2022 09:56 AM    HCT 40.6 10/06/2021 11:27 AM    MCV 90.7 04/19/2022 09:42 AM    MCV 90.7 04/07/2022 09:56 AM    MCV 92.3 10/06/2021 11:27 AM     04/19/2022 09:42 AM     04/07/2022 09:56 AM     10/06/2021 11:27 AM     Lab Results   Component Value Date/Time    CHOL 130 04/07/2022 09:56 AM    CHOL 136 10/06/2021 11:27 AM    TRIG 101 04/07/2022 09:56 AM    TRIG 145 10/06/2021 11:27 AM    HDL 35 04/07/2022 09:56 AM    HDL 32 10/06/2021 11:27 AM       Vitals 8/23/2022 4/6/2022 13/84/1511   SYSTOLIC 551 061 860   DIASTOLIC 79 76 80   Site - - Left Upper Arm   Position - - Sitting   Cuff Size - - Medium Adult   Pulse 85 80 77   SpO2 99 99 97   Weight 215 lb 9.6 oz 209 lb 12.8 oz 214 lb   Height 5' 11\" 5' 11\" 5' 11\"   Body mass index 30.07 kg/m2 29.26 kg/m2 29.84 kg/m2       Patient had gross hematuria about 4 days ago he did hydrate himself and he noticed that his urine has cleared up he does have some frequency and urgency but no fever abdominal pain nausea or vomiting    Hematuria  This is a new problem. The current episode started in the past 7 days. He describes the hematuria as gross hematuria. The hematuria occurs throughout his entire urinary stream. He is experiencing no pain. He describes his urine color as light pink. Irritative symptoms include frequency and urgency. Irritative symptoms do not include nocturia. Obstructive symptoms do not include dribbling, incomplete emptying, an intermittent stream, straining or a weak stream.     Review of Systems   Genitourinary:  Positive for frequency, hematuria and urgency. Negative for incomplete emptying and nocturia. Objective   Physical Exam  Constitutional:       General: He is not in acute distress. Appearance: Normal appearance. HENT:      Head: Normocephalic and atraumatic.       Right Ear: Tympanic membrane normal.      Left Ear: Tympanic membrane normal.      Nose: Nose normal.   Eyes:      Extraocular Movements: Extraocular movements intact. Conjunctiva/sclera: Conjunctivae normal.      Pupils: Pupils are equal, round, and reactive to light. Neck:      Vascular: No carotid bruit. Cardiovascular:      Rate and Rhythm: Normal rate and regular rhythm. Pulses: Normal pulses. Heart sounds: No murmur heard. Pulmonary:      Effort: Pulmonary effort is normal. No respiratory distress. Breath sounds: Normal breath sounds. Abdominal:      General: Abdomen is flat. Bowel sounds are normal. There is no distension. Palpations: Abdomen is soft. Tenderness: There is no abdominal tenderness. Musculoskeletal:         General: No swelling, tenderness or deformity. Cervical back: Normal range of motion and neck supple. No rigidity or tenderness. Right lower leg: No edema. Left lower leg: No edema. Lymphadenopathy:      Cervical: No cervical adenopathy. Skin:     Coloration: Skin is not jaundiced. Findings: No bruising, erythema or lesion. Neurological:      General: No focal deficit present. Mental Status: He is alert and oriented to person, place, and time. Cranial Nerves: No cranial nerve deficit. Motor: No weakness. Gait: Gait normal.          This dictation was generated by voice recognition computer software. Although all attempts are made to edit the dictation for accuracy, there may be errors in the transcription that are not intended. An electronic signature was used to authenticate this note.     --Joan Valenzuela MD

## 2022-08-24 LAB — URINE CULTURE, ROUTINE: NORMAL

## 2022-09-17 ENCOUNTER — HOSPITAL ENCOUNTER (OUTPATIENT)
Dept: CT IMAGING | Age: 72
Discharge: HOME | End: 2022-09-17
Payer: MEDICARE

## 2022-09-17 DIAGNOSIS — R31.0 GROSS HEMATURIA: ICD-10-CM

## 2022-09-17 DIAGNOSIS — I10 ESSENTIAL (PRIMARY) HYPERTENSION: ICD-10-CM

## 2022-09-17 LAB
BUN BLDV-MCNC: 27 MG/DL (ref 7–20)
CREAT SERPL-MCNC: 1.2 MG/DL (ref 0.8–1.3)
GFR AFRICAN AMERICAN: >60
GFR NON-AFRICAN AMERICAN: 59

## 2022-09-17 PROCEDURE — 6360000004 HC RX CONTRAST MEDICATION: Performed by: NURSE PRACTITIONER

## 2022-09-17 PROCEDURE — 36415 COLL VENOUS BLD VENIPUNCTURE: CPT

## 2022-09-17 PROCEDURE — 84520 ASSAY OF UREA NITROGEN: CPT

## 2022-09-17 PROCEDURE — 82565 ASSAY OF CREATININE: CPT

## 2022-09-17 PROCEDURE — 74178 CT ABD&PLV WO CNTR FLWD CNTR: CPT | Performed by: NURSE PRACTITIONER

## 2022-09-17 RX ADMIN — IOPAMIDOL 120 ML: 755 INJECTION, SOLUTION INTRAVENOUS at 13:28

## 2022-09-19 ENCOUNTER — OFFICE VISIT (OUTPATIENT)
Dept: INTERNAL MEDICINE CLINIC | Age: 72
End: 2022-09-19
Payer: MEDICARE

## 2022-09-19 ENCOUNTER — TELEPHONE (OUTPATIENT)
Dept: INTERNAL MEDICINE CLINIC | Age: 72
End: 2022-09-19

## 2022-09-19 VITALS
HEIGHT: 71 IN | HEART RATE: 78 BPM | OXYGEN SATURATION: 98 % | SYSTOLIC BLOOD PRESSURE: 134 MMHG | DIASTOLIC BLOOD PRESSURE: 72 MMHG | BODY MASS INDEX: 30.57 KG/M2 | WEIGHT: 218.4 LBS

## 2022-09-19 DIAGNOSIS — N28.89 RENAL MASS: Primary | ICD-10-CM

## 2022-09-19 DIAGNOSIS — N39.46 MIXED STRESS AND URGE URINARY INCONTINENCE: ICD-10-CM

## 2022-09-19 DIAGNOSIS — I10 ESSENTIAL HYPERTENSION: ICD-10-CM

## 2022-09-19 PROCEDURE — 1123F ACP DISCUSS/DSCN MKR DOCD: CPT | Performed by: INTERNAL MEDICINE

## 2022-09-19 PROCEDURE — 99214 OFFICE O/P EST MOD 30 MIN: CPT | Performed by: INTERNAL MEDICINE

## 2022-09-19 RX ORDER — CHLORTHALIDONE 25 MG/1
25 TABLET ORAL DAILY
Qty: 90 TABLET | Refills: 1 | Status: SHIPPED | OUTPATIENT
Start: 2022-09-19

## 2022-09-19 RX ORDER — TOLTERODINE 4 MG/1
CAPSULE, EXTENDED RELEASE ORAL
Qty: 90 CAPSULE | Refills: 1 | Status: SHIPPED | OUTPATIENT
Start: 2022-09-19 | End: 2022-10-24 | Stop reason: SDUPTHER

## 2022-09-19 ASSESSMENT — ENCOUNTER SYMPTOMS
SHORTNESS OF BREATH: 0
BLURRED VISION: 0

## 2022-09-19 NOTE — PROGRESS NOTES
Kulwant Jose (:  1950) is a 67 y.o. male,Established patient, here for evaluation of the following chief complaint(s):  6 Month Follow-Up         ASSESSMENT/PLAN:  1. Renal mass  -     MRI ABDOMEN W CONTRAST; Future  -     Creatinine; Future  2. Essential hypertension  -     chlorthalidone (HYGROTON) 25 MG tablet; Take 1 tablet by mouth daily, Disp-90 tablet, R-1Normal  3. Mixed stress and urge urinary incontinence  -     tolterodine (DETROL LA) 4 MG extended release capsule; TAKE 1 CAPSULE DAILY, Disp-90 capsule, R-1Normal  Reviewed patient's finding on his CAT scan he does have 2 lesions one of them is very suspicious for renal cell cancer the other 1 is a complex cyst recommendation for MRI was made at this point an MRI has been ordered but they advised the patient strongly to contact the urologist which she is going to do to decide on the next steps we did discussed the different scenarios of how this can play and will get a follow the patient closely    Patient blood pressure is well controlled he needs refill his prescription it has been ordered      No follow-ups on file.          Subjective   SUBJECTIVE/OBJECTIVE:    Lab Review   Lab Results   Component Value Date/Time     2022 09:56 AM     10/12/2021 10:03 AM     10/06/2021 11:27 AM    K 4.5 2022 09:56 AM    K 4.1 10/12/2021 10:03 AM    K 4.3 10/06/2021 11:27 AM    CO2 22 2022 09:56 AM    CO2 21 10/12/2021 10:03 AM    CO2 20 10/06/2021 11:27 AM    BUN 27 2022 01:00 PM    BUN 28 2022 09:56 AM    BUN 26 10/12/2021 10:03 AM    CREATININE 1.2 2022 01:00 PM    CREATININE 1.2 2022 09:56 AM    CREATININE 1.4 10/12/2021 10:03 AM    GLUCOSE 88 2022 09:56 AM    GLUCOSE 107 10/12/2021 10:03 AM    GLUCOSE 97 10/06/2021 11:27 AM    CALCIUM 9.4 2022 09:56 AM    CALCIUM 9.3 10/12/2021 10:03 AM    CALCIUM 9.5 10/06/2021 11:27 AM     Lab Results   Component Value Date/Time    WBC 6.8 04/19/2022 09:42 AM    WBC 6.4 04/07/2022 09:56 AM    WBC 6.6 10/06/2021 11:27 AM    HGB 13.2 04/19/2022 09:42 AM    HGB 13.2 04/07/2022 09:56 AM    HGB 13.7 10/06/2021 11:27 AM    HCT 39.3 04/19/2022 09:42 AM    HCT 39.9 04/07/2022 09:56 AM    HCT 40.6 10/06/2021 11:27 AM    MCV 90.7 04/19/2022 09:42 AM    MCV 90.7 04/07/2022 09:56 AM    MCV 92.3 10/06/2021 11:27 AM     04/19/2022 09:42 AM     04/07/2022 09:56 AM     10/06/2021 11:27 AM     Lab Results   Component Value Date/Time    CHOL 130 04/07/2022 09:56 AM    CHOL 136 10/06/2021 11:27 AM    TRIG 101 04/07/2022 09:56 AM    TRIG 145 10/06/2021 11:27 AM    HDL 35 04/07/2022 09:56 AM    HDL 32 10/06/2021 11:27 AM       Vitals 9/19/2022 8/23/2022 8/1/0875   SYSTOLIC 372 320 037   DIASTOLIC 72 79 76   Site - - -   Position - - -   Cuff Size - - -   Pulse 78 85 80   SpO2 98 99 99   Weight 218 lb 6.4 oz 215 lb 9.6 oz 209 lb 12.8 oz   Height 5' 11\" 5' 11\" 5' 11\"   Body mass index 30.46 kg/m2 30.07 kg/m2 29.26 kg/m2       Renal mass patient is not having any further bleeding but he is aware of the findings on the CAT scan and he requested to discuss him    Hypertension  This is a chronic problem. The current episode started more than 1 year ago. The problem is unchanged. The problem is controlled. Pertinent negatives include no anxiety, blurred vision, peripheral edema, PND, shortness of breath or sweats. Review of Systems   Eyes:  Negative for blurred vision. Respiratory:  Negative for shortness of breath. Cardiovascular:  Negative for PND. Objective   Physical Exam  Vitals and nursing note reviewed. Constitutional:       General: He is not in acute distress. Appearance: Normal appearance. HENT:      Head: Normocephalic and atraumatic. Right Ear: Tympanic membrane normal.      Left Ear: Tympanic membrane normal.      Nose: Nose normal.   Eyes:      Extraocular Movements: Extraocular movements intact. Conjunctiva/sclera: Conjunctivae normal.      Pupils: Pupils are equal, round, and reactive to light. Neck:      Vascular: No carotid bruit. Cardiovascular:      Rate and Rhythm: Normal rate and regular rhythm. Pulses: Normal pulses. Heart sounds: No murmur heard. Pulmonary:      Effort: Pulmonary effort is normal. No respiratory distress. Breath sounds: Normal breath sounds. Abdominal:      General: Abdomen is flat. Bowel sounds are normal. There is no distension. Palpations: Abdomen is soft. Tenderness: There is no abdominal tenderness. Musculoskeletal:         General: No swelling, tenderness or deformity. Cervical back: Normal range of motion and neck supple. No rigidity or tenderness. Right lower leg: No edema. Left lower leg: No edema. Lymphadenopathy:      Cervical: No cervical adenopathy. Skin:     Coloration: Skin is not jaundiced. Findings: No bruising, erythema or lesion. Neurological:      General: No focal deficit present. Mental Status: He is alert and oriented to person, place, and time. Cranial Nerves: No cranial nerve deficit. Motor: No weakness. Gait: Gait normal.          This dictation was generated by voice recognition computer software. Although all attempts are made to edit the dictation for accuracy, there may be errors in the transcription that are not intended. An electronic signature was used to authenticate this note.     --Hunter Rodriguez MD

## 2022-09-19 NOTE — TELEPHONE ENCOUNTER
----- Message from Andrea Cox sent at 9/19/2022 10:52 AM EDT -----  Subject: Referral Request    Reason for referral request?       Patient is needing the MRI order to be resent   and it needs to say With and with out contrast. If you can resend she will   send it right away. he would also like the Creatinine to be canceled as he   already had that done. Provider patient wants to be referred to(if known):     Provider Phone Number(if known):     Additional Information for Provider?   ---------------------------------------------------------------------------  --------------  [address]    [phone]; OK to leave message on voicemail  ---------------------------------------------------------------------------  --------------

## 2022-10-04 ENCOUNTER — PATIENT MESSAGE (OUTPATIENT)
Dept: INTERNAL MEDICINE CLINIC | Age: 72
End: 2022-10-04

## 2022-10-04 ENCOUNTER — HOSPITAL ENCOUNTER (OUTPATIENT)
Dept: MRI IMAGING | Age: 72
Discharge: HOME | End: 2022-10-04
Payer: MEDICARE

## 2022-10-04 DIAGNOSIS — N28.89 RENAL MASS: ICD-10-CM

## 2022-10-04 PROCEDURE — A9577 INJ MULTIHANCE: HCPCS | Performed by: INTERNAL MEDICINE

## 2022-10-04 PROCEDURE — 74183 MRI ABD W/O CNTR FLWD CNTR: CPT

## 2022-10-04 PROCEDURE — 6360000004 HC RX CONTRAST MEDICATION: Performed by: INTERNAL MEDICINE

## 2022-10-04 RX ORDER — 0.9 % SODIUM CHLORIDE 0.9 %
INTRAVENOUS SOLUTION INTRAVENOUS ONCE
Status: DISCONTINUED | OUTPATIENT
Start: 2022-10-04 | End: 2022-10-05 | Stop reason: HOSPADM

## 2022-10-04 RX ADMIN — GADOBENATE DIMEGLUMINE 19 ML: 529 INJECTION, SOLUTION INTRAVENOUS at 10:20

## 2022-10-04 NOTE — TELEPHONE ENCOUNTER
From: Jasper Bring  To: Dr. Manule Escobar: 10/4/2022 2:12 PM EDT  Subject: MRI results      Zabrina called to discus my MRI results, asked me to return call her, which I tried to do, but no one answered. Results can be given to my wife, Philippe, as she is my next of kin. Nory Eldridge said she could not discuss my results with her).

## 2022-10-09 DIAGNOSIS — I10 ESSENTIAL HYPERTENSION: ICD-10-CM

## 2022-10-10 RX ORDER — CHLORTHALIDONE 25 MG/1
TABLET ORAL
Qty: 90 TABLET | Refills: 1 | OUTPATIENT
Start: 2022-10-10

## 2022-10-19 DIAGNOSIS — N39.46 MIXED STRESS AND URGE URINARY INCONTINENCE: ICD-10-CM

## 2022-10-19 RX ORDER — TOLTERODINE 4 MG/1
CAPSULE, EXTENDED RELEASE ORAL
Qty: 90 CAPSULE | Refills: 1 | OUTPATIENT
Start: 2022-10-19

## 2022-10-20 ENCOUNTER — TELEPHONE (OUTPATIENT)
Dept: INTERNAL MEDICINE CLINIC | Age: 72
End: 2022-10-20

## 2022-10-20 DIAGNOSIS — N39.46 MIXED STRESS AND URGE URINARY INCONTINENCE: ICD-10-CM

## 2022-10-20 NOTE — TELEPHONE ENCOUNTER
----- Message from General Walter MA sent at 10/19/2022  2:31 PM EDT -----  Regarding: FW:  Medication refill    ----- Message -----  From: Butch Huddleston  Sent: 10/19/2022   2:28 PM EDT  To: , #  Subject:  Medication refill                               Detrol LA 4 mg/cap   I get this medication refilled at the [facility] because it is much less expensive there. They have a prescription from you, but it has no refills remaining. Please refill my prescription, as I have only about 20 left. ([phone])  Thank you!

## 2022-10-21 RX ORDER — TOLTERODINE 4 MG/1
CAPSULE, EXTENDED RELEASE ORAL
Qty: 90 CAPSULE | Refills: 1 | OUTPATIENT
Start: 2022-10-21

## 2022-10-24 RX ORDER — TOLTERODINE 4 MG/1
CAPSULE, EXTENDED RELEASE ORAL
Qty: 90 CAPSULE | Refills: 1 | Status: SHIPPED | OUTPATIENT
Start: 2022-10-24

## 2022-10-26 ENCOUNTER — NURSE ONLY (OUTPATIENT)
Dept: INTERNAL MEDICINE CLINIC | Age: 72
End: 2022-10-26
Payer: MEDICARE

## 2022-10-26 DIAGNOSIS — Z23 NEED FOR INFLUENZA VACCINATION: Primary | ICD-10-CM

## 2022-10-26 PROCEDURE — G0008 ADMIN INFLUENZA VIRUS VAC: HCPCS | Performed by: INTERNAL MEDICINE

## 2022-10-26 PROCEDURE — 90694 VACC AIIV4 NO PRSRV 0.5ML IM: CPT | Performed by: INTERNAL MEDICINE

## 2022-11-17 ENCOUNTER — HOSPITAL ENCOUNTER (EMERGENCY)
Age: 72
Discharge: HOME | End: 2022-11-17
Payer: MEDICARE

## 2022-11-17 VITALS
SYSTOLIC BLOOD PRESSURE: 122 MMHG | DIASTOLIC BLOOD PRESSURE: 68 MMHG | BODY MASS INDEX: 29.4 KG/M2 | OXYGEN SATURATION: 93 % | HEART RATE: 85 BPM | HEIGHT: 71 IN | TEMPERATURE: 98.5 F | WEIGHT: 210 LBS | RESPIRATION RATE: 18 BRPM

## 2022-11-17 DIAGNOSIS — R50.9 FEVER, UNSPECIFIED FEVER CAUSE: Primary | ICD-10-CM

## 2022-11-17 DIAGNOSIS — N30.01 ACUTE CYSTITIS WITH HEMATURIA: ICD-10-CM

## 2022-11-17 LAB
BACTERIA: ABNORMAL /HPF
BILIRUBIN URINE: ABNORMAL
BLOOD, URINE: ABNORMAL
CLARITY: ABNORMAL
COLOR: ABNORMAL
EPITHELIAL CELLS, UA: 4 /HPF (ref 0–5)
GLUCOSE URINE: NEGATIVE MG/DL
HYALINE CASTS: 2 /LPF (ref 0–8)
KETONES, URINE: NEGATIVE MG/DL
LEUKOCYTE ESTERASE, URINE: ABNORMAL
MICROSCOPIC EXAMINATION: YES
NITRITE, URINE: POSITIVE
PH UA: 5 (ref 5–8)
PROTEIN UA: 30 MG/DL
RBC UA: 234 /HPF (ref 0–4)
SPECIFIC GRAVITY UA: 1.02 (ref 1–1.03)
URINE REFLEX TO CULTURE: YES
URINE TYPE: ABNORMAL
UROBILINOGEN, URINE: 1 E.U./DL
WBC UA: 17 /HPF (ref 0–5)

## 2022-11-17 PROCEDURE — 81001 URINALYSIS AUTO W/SCOPE: CPT

## 2022-11-17 PROCEDURE — 99283 EMERGENCY DEPT VISIT LOW MDM: CPT

## 2022-11-17 PROCEDURE — 87086 URINE CULTURE/COLONY COUNT: CPT

## 2022-11-17 RX ORDER — CEFUROXIME AXETIL 250 MG/1
500 TABLET ORAL 2 TIMES DAILY
Qty: 40 TABLET | Refills: 0 | Status: SHIPPED | OUTPATIENT
Start: 2022-11-17 | End: 2022-11-27

## 2022-11-17 ASSESSMENT — ENCOUNTER SYMPTOMS
VOMITING: 0
SHORTNESS OF BREATH: 0
BACK PAIN: 0
DIARRHEA: 0
CONSTIPATION: 0
COUGH: 0
COLOR CHANGE: 0
WHEEZING: 0
NAUSEA: 0
ABDOMINAL PAIN: 0
STRIDOR: 0

## 2022-11-17 ASSESSMENT — PAIN - FUNCTIONAL ASSESSMENT: PAIN_FUNCTIONAL_ASSESSMENT: NONE - DENIES PAIN

## 2022-11-18 LAB — URINE CULTURE, ROUTINE: NORMAL

## 2022-11-18 NOTE — ED NOTES
Discharge instructions explained by ED provider. Patient verbalized understanding and denies any other concerns or complaints at this time. Patient vital signs stable and no acute signs or symptoms of distress noted at discharge. Patient deemed clinically stable. Patient d/c home with family.      Norbert Stearns RN  11/17/22 2550

## 2022-11-18 NOTE — ED PROVIDER NOTES
[facility]        Pt Name: Sagrario Sotomayor  MRN: [id number]  [place] 1950  Date of evaluation: 11/17/2022  Provider: Genia Coon PA-C  PCP: Anders Ac MD  Note Started: 7:18 PM EST 11/17/2022        ILA. I have evaluated this patient. My supervising physician was available for consultation. CHIEF COMPLAINT       Chief Complaint   Patient presents with    Back Pain     Had cystoscopy on 36/33 without complications. Pt started feeling feverish this morning. Temp was 101.9 at home, pt denies pain at this time. Temp 98.5 during triage. HISTORY OF PRESENT ILLNESS   (Location, Timing/Onset, Context/Setting, Quality, Duration, Modifying Factors, Severity, Associated Signs and Symptoms)  Note limiting factors. Chief Complaint: Event    Sagrario Sotomayor is a 67 y.o. male who presents to the emergency department stating that on 11/15/2022 he had a scheduled cystoscopy with Dr. Sebastien Ryan, his urologist, for intermittent hematuria he has been experiencing. He has a known left kidney mass. During the cystoscopy, patient had an evaluation of the collecting system. He was told that if his collecting system was involved that they would remove the kidney entirely. However he was told that the collecting system was not involved. Therefore he was told the urologist will be able to \"freeze \"the kidney tumor instead. He developed chills this morning. Later this evening, wife checked his temperature and it was 101.9 Fahrenheit. He did not take any medication for the fever and yet his temperature is currently 98.5 Fahrenheit. He has no active rigors. Denies other complaints at this time. Nursing Notes were all reviewed and agreed with or any disagreements were addressed in the HPI.     REVIEW OF SYSTEMS    (2-9 systems for level 4, 10 or more for level 5)     Review of Systems   Constitutional:  Positive for chills and fever.   HENT: Negative. Eyes:  Negative for visual disturbance. Respiratory:  Negative for cough, shortness of breath, wheezing and stridor. Cardiovascular:  Negative for chest pain, palpitations and leg swelling. Gastrointestinal:  Negative for abdominal pain, constipation, diarrhea, nausea and vomiting. Endocrine: Negative. Genitourinary: Negative. Musculoskeletal:  Negative for back pain, neck pain and neck stiffness. Skin:  Negative for color change, pallor, rash and wound. Neurological: Negative. Psychiatric/Behavioral:  Negative for confusion. All other systems reviewed and are negative. Positives and Pertinent negatives as per HPI. Except as noted above in the ROS, all other systems were reviewed and negative. PAST MEDICAL HISTORY     Past Medical History:   Diagnosis Date    Permanent atrial fibrillation ([facility] [address] 10/6/2021    Postural urinary incontinence 10/6/2021    Status post prostatectomy patient has a device that is controlling it    Primary hypertension 10/6/2021    Prostate cancer ([facility] [address] 10/6/2021         SURGICAL HISTORY   History reviewed. No pertinent surgical history.       [address]       Discharge Medication List as of 11/17/2022  7:16 PM        CONTINUE these medications which have NOT CHANGED    Details   tolterodine (DETROL LA) 4 MG extended release capsule TAKE 1 CAPSULE DAILY, Disp-90 capsule, R-1Normal      chlorthalidone (HYGROTON) 25 MG tablet Take 1 tablet by mouth daily, Disp-90 tablet, R-1Normal      carvedilol (COREG) 12.5 MG tablet TAKE 1 TABLET TWICE DAILY  WITH MEALS, Disp-180 tablet, R-1Normal      potassium chloride (KLOR-CON M) 20 MEQ extended release tablet TAKE 1 TABLET TWICE A DAY, Disp-180 tablet, R-1Normal      dilTIAZem (TIAZAC) 240 MG extended release capsule TAKE 1 CAPSULE DAILY, Disp-90 capsule, R-1Normal      atorvastatin (LIPITOR) 20 MG tablet TAKE 1 TABLET NIGHTLY, Disp-90 tablet, R-1Normal      ELIQUIS 5 MG TABS tablet TAKE 1 TABLET TWICE A DAY, Disp-180 tablet, R-1Normal      losartan (COZAAR) 50 MG tablet Take 1 tablet by mouth 2 times daily, Disp-180 tablet, R-11Normal      vitamin D 25 MCG (1000 UT) CAPS Take 1,000 Units by mouth dailyHistorical Med      coenzyme Q10 200 MG CAPS capsule Take 200 mg by mouth dailyHistorical Med      cyanocobalamin 500 MCG tablet Take 1,000 mcg by mouth dailyHistorical Med      acetaminophen (TYLENOL) 500 MG tablet Take 1,000 mg by mouth 2 times dailyHistorical Med               ALLERGIES     Enoxaparin, Lisinopril, Pravachol [pravastatin], Tuberculin ppd, and Morphine    FAMILYHISTORY     History reviewed. No pertinent family history. SOCIAL HISTORY       Social History     Tobacco Use    Smoking status: Never    Smokeless tobacco: Never   Substance Use Topics    Alcohol use: Never    Drug use: Never       SCREENINGS             PHYSICAL EXAM    (up to 7 for level 4, 8 or more for level 5)     ED Triage Vitals [11/17/22 1839]   BP Temp Temp Source Heart Rate Resp SpO2 Height Weight   122/68 98.5 °F (36.9 °C) Oral 85 18 93 % 5' 11\" (1.803 m) 210 lb (95.3 kg)       Physical Exam  Vitals and nursing note reviewed. Constitutional:       Appearance: Normal appearance. He is well-developed. He is not toxic-appearing or diaphoretic. HENT:      Head: Normocephalic and atraumatic. Right Ear: External ear normal.      Left Ear: External ear normal.      Nose: Nose normal.      Mouth/Throat:      Mouth: Mucous membranes are moist.      Pharynx: Oropharynx is clear. Eyes:      General: No scleral icterus. Right eye: No discharge. Left eye: No discharge. Extraocular Movements: Extraocular movements intact. Conjunctiva/sclera: Conjunctivae normal.      Pupils: Pupils are equal, round, and reactive to light. Neck:      Trachea: Trachea and phonation normal.      Meningeal: Brudzinski's sign and Kernig's sign absent.    Cardiovascular:      Rate and Rhythm: Normal rate.   Pulmonary:      Effort: Pulmonary effort is normal.      Breath sounds: Normal breath sounds. Abdominal:      General: Bowel sounds are normal. There is no distension. Palpations: Abdomen is soft. Tenderness: There is no abdominal tenderness. There is no right CVA tenderness, left CVA tenderness or guarding. Musculoskeletal:         General: Normal range of motion. Cervical back: Full passive range of motion without pain, normal range of motion and neck supple. Skin:     General: Skin is warm and dry. Coloration: Skin is not jaundiced or pale. Findings: No bruising, erythema, lesion or rash. Neurological:      General: No focal deficit present. Mental Status: He is alert and oriented to person, place, and time. Psychiatric:         Behavior: Behavior normal.       DIAGNOSTIC RESULTS   LABS:    Labs Reviewed   URINALYSIS WITH REFLEX TO CULTURE - Abnormal; Notable for the following components:       Result Value    Clarity, UA TURBID (*)     Bilirubin Urine SMALL (*)     Blood, Urine LARGE (*)     Protein, UA 30 (*)     Nitrite, Urine POSITIVE (*)     Leukocyte Esterase, Urine MODERATE (*)     All other components within normal limits   MICROSCOPIC URINALYSIS - Abnormal; Notable for the following components:    WBC, UA 17 (*)     RBC,  (*)     All other components within normal limits   CULTURE, URINE       When ordered only abnormal lab results are displayed. All other labs were within normal range or not returned as of this dictation. EKG: When ordered, EKG's are interpreted by the Emergency Department Physician in the absence of a cardiologist.  Please see their note for interpretation of EKG.     RADIOLOGY:   Non-plain film images such as CT, Ultrasound and MRI are read by the radiologist. Plain radiographic images are visualized and preliminarily interpreted by the ED Provider with the below findings:        Interpretation per the Radiologist below, if available at the time of this note:    No orders to display     No results found. PROCEDURES   Unless otherwise noted below, none     Procedures    CRITICAL CARE TIME   N/a    CONSULTS:  IP CONSULT TO UROLOGY  After several attempts to reach urology group/ Dr Jaison Babin, we never heard back from them. Patient advised to follow up/call office tomorrow morning. EMERGENCY DEPARTMENT COURSE and DIFFERENTIAL DIAGNOSIS/MDM:   Vitals:    Vitals:    11/17/22 1839   BP: 122/68   Pulse: 85   Resp: 18   Temp: 98.5 °F (36.9 °C)   TempSrc: Oral   SpO2: 93%   Weight: 210 lb (95.3 kg)   Height: 5' 11\" (1.803 m)       Patient was given the following medications:  Medications - No data to display      Is this patient to be included in the SEP-1 Core Measure due to severe sepsis or septic shock? No   Exclusion criteria - the patient is NOT to be included for SEP-1 Core Measure due to:  2+ SIRS criteria are not met    This patient presents with reported chills and fever at home. He is afebrile without rigors at this time. He is currently on Pyridium. He had a cystoscopy 2 days ago. Urinalysis does reveal evidence of infection. Patient did not want to wait around for his results. He agreed that I could send prescription to pharmacy if urinalysis reveals evidence of infection. He declined flu or COVID swabs. Clines any further work-up at this time. Stating he feels fine currently. He agrees to follow-up closely with his urologist.  Abdomen is soft and nontender in all 4 quadrants without pulsatile mass or CVA tenderness.   My suspicion is low for acute surgical abdomen, obstruction, perforation, abscess, mesenteric ischemia, AAA, dissection, cholecystitis, cholangitis, pancreatitis, appendicitis, C. diff colitis, diverticulitis, volvulus, incarcerated hernia, necrotizing fasciitis, testicular torsion, epididymitis, varicocele, hydrocele, orchitis, incarcerated hernia, Vick gangrene, pyelonephritis, perinephric abscess, kidney stone, urosepsis, fistula, intussusception, pyloric stenosis, or other concerning pathology. I called and spoke with patient at 2000 about  his urine results. He is aware and will  prescription. I urged him to follow up with urologist tomorrow. FINAL IMPRESSION      1. Fever, unspecified fever cause    2. Acute cystitis with hematuria          DISPOSITION/PLAN   DISPOSITION Decision To Discharge 11/17/2022 07:11:07 PM      PATIENT REFERRED TO:  Laura Pool MD  [address] [place] [address]          Urban Brannon, [address] [place] [address] [address] [id number]  [phone]      urology follow up in 1-3 days    [facility] Emergency Department  [facility]  [phone]    If symptoms worsen      DISCHARGE MEDICATIONS:  Discharge Medication List as of 11/17/2022  7:16 PM      Ceftin 500 mg BID x10 days.     DISCONTINUED MEDICATIONS:  Discharge Medication List as of 11/17/2022  7:16 PM                 (Please note that portions of this note were completed with a voice recognition program.  Efforts were made to edit the dictations but occasionally words are mis-transcribed.)    Eusebio Baptiste PA-C (electronically signed)            Eusebio Baptiste PA-C  11/17/22 2051

## 2022-11-21 NOTE — PROGRESS NOTES
[facility]   Cardiac Evaluation      Patient: Kenya Lloyd  YOB: 1950         Chief Complaint   Patient presents with    Follow-up    Hypertension    Hyperlipidemia    Atrial Fibrillation          Referring provider: Marisela Flores MD    History of Present Illness:   Kenya Lloyd is a 67 y.o. male here for one year follow up. Established care with me in 2021 after moving here from [place]. He is a retired nurse. Mr. Haley Nance was supposed to see me 2 weeks ago but unfortunately had post op complications following cystoscopy. Today he reports feeling well. He is s/p cysto 2 weeks ago. States he has a tumor that they plan to remove at the start of the year which he is hopeful to be the cure for his cancer. Denies cardiac symptoms. Looking forward alphonso with his grandchildren. PMH Afib, HTN, HLD, CKD. With regard to medication therapy he/she has been compliant with prescribed regimen and has tolerated therapy to date. Past Medical History:   has a past medical history of Permanent atrial fibrillation ([address] Postural urinary incontinence, Primary hypertension, and Prostate cancer ([facility] [address] Surgical History:   has no past surgical history on file.      Current Outpatient Medications   Medication Sig Dispense Refill    tolterodine (DETROL LA) 4 MG extended release capsule TAKE 1 CAPSULE DAILY 90 capsule 1    chlorthalidone (HYGROTON) 25 MG tablet Take 1 tablet by mouth daily 90 tablet 1    carvedilol (COREG) 12.5 MG tablet TAKE 1 TABLET TWICE DAILY  WITH MEALS 180 tablet 1    potassium chloride (KLOR-CON M) 20 MEQ extended release tablet TAKE 1 TABLET TWICE A  tablet 1    dilTIAZem (TIAZAC) 240 MG extended release capsule TAKE 1 CAPSULE DAILY 90 capsule 1    atorvastatin (LIPITOR) 20 MG tablet TAKE 1 TABLET NIGHTLY 90 tablet 1    ELIQUIS 5 MG TABS tablet TAKE 1 TABLET TWICE A  tablet 1    losartan (COZAAR) 50 MG tablet Take 1 tablet by mouth 2 times daily 180 tablet 11    vitamin D 25 MCG (1000 UT) CAPS Take 1,000 Units by mouth daily      coenzyme Q10 200 MG CAPS capsule Take 200 mg by mouth daily      cyanocobalamin 500 MCG tablet Take 1,000 mcg by mouth daily      acetaminophen (TYLENOL) 500 MG tablet Take 1,000 mg by mouth 2 times daily       No current facility-administered medications for this visit. Allergies:  Enoxaparin, Lisinopril, Pravachol [pravastatin], Tuberculin ppd, and Morphine     Social History:  Social History     Socioeconomic History    Marital status:      Spouse name: Not on file    Number of children: Not on file    Years of education: Not on file    Highest education level: Not on file   Occupational History    Not on file   Tobacco Use    Smoking status: Never    Smokeless tobacco: Never   Substance and Sexual Activity    Alcohol use: Never    Drug use: Never    Sexual activity: Not on file   Other Topics Concern    Not on file   Social History Narrative    Not on file     Social Determinants of Health     Financial Resource Strain: Not on file   Food Insecurity: Not on file   Transportation Needs: Not on file   Physical Activity: Unknown    Days of Exercise per Week: 7 days    Minutes of Exercise per Session: Not on file   Stress: Not on file   Social Connections: Not on file   Intimate Partner Violence: Not on file   Housing Stability: Not on file       Family History:   History reviewed. No pertinent family history. Family history has been reviewed and not pertinent except as noted above. Review of Systems:   Constitutional: there has been no unanticipated weight loss. No change in energy or activity level   Eyes: No visual changes   ENT: No Headaches, hearing loss or vertigo. No mouth sores or sore throat. Cardiovascular: Reviewed in HPI  Respiratory: No cough or wheezing, no sputum production. Gastrointestinal: No abdominal pain, appetite loss, blood in stools.  No change in bowel or bladder habits. Genitourinary: No nocturia, dysuria, trouble voiding  Musculoskeletal:  No gait disturbance, weakness or joint complaints. Integumentary: No rash or pruritis. Neurological: No headache, change in muscle strength, numbness or tingling. No change in gait, balance, coordination, mood, affect, memory, mentation, behavior. Psychiatric: No anxiety or depression  Endocrine: No malaise or fever  Hematologic/Lymphatic: No abnormal bruising or bleeding, blood clots or swollen lymph nodes. Allergic/Immunologic: No nasal congestion or hives. Physical Examination:    Vitals:    12/09/22 0752   BP: 132/82   Site: Right Upper Arm   Position: Sitting   Cuff Size: Medium Adult   Pulse: 76   SpO2: 99%   Weight: 216 lb (98 kg)   Height: 5' 11\" (1.803 m)       Body mass index is 30.13 kg/m². Wt Readings from Last 3 Encounters:   12/09/22 216 lb (98 kg)   11/17/22 210 lb (95.3 kg)   09/19/22 218 lb 6.4 oz (99.1 kg)      BP Readings from Last 3 Encounters:   12/09/22 132/82   11/17/22 122/68   09/19/22 134/72        Physical Examination:    CONSTITUTIONAL: Well developed, well nourished  EYES: PERRLA. No xanthelasma, sclera non icteric  EARS,NOSE,MOUTH,THROAT:  Mucous membranes moist, normal hearing  NECK: Supple, JVP normal, thyroid not enlarged. Carotids 2+ without bruits  RESPIRATORY: Normal effort, no rales or rhonchi  CARDIOVASCULAR: Normal PMI, regular rate and rhythm, no murmurs, rub or gallop. No edema. Radial pulses present and equal  CHEST: No scar or masses  ABDOMEN: Normal bowel sounds. No masses or tenderness. No bruit  MUSCULOSKELETAL: No clubbing or cyanosis. Moves all extremities well. Normal gait  SKIN:  Warm and dry. No rashes  NEUROLOGIC: Cranial nerves intact. Alert and oriented  PSYCHIATRIC: Calm affect. Appears to have normal judgement and insight    All testing and labs listed below were personally reviewed by myself. Assessment/Plan  1. Paroxysmal atrial fibrillation (HCC)    2.  Essential hypertension    3. Mixed hyperlipidemia    4. Stage 3 chronic kidney disease, unspecified whether stage 3a or 3b CKD (HCC)        Paroxysmal atrial fibrillation (HCC)  Current Rhythm~ NSR  Rate controlled  ChadsVasc score~ 2  Current meds~ eliquis / diltiazem  Plan~ 30 day monitor. Last episode of afib was >5 years ago. If no afib on monitor, then risk of recurrence is low and can dc OAC. Essential hypertension  Vitals:    12/09/22 0752   BP: 132/82   Pulse: 76   SpO2: 99%     Meds~ coreg / hygroton / losartan   Plan~ stable     Hyperlipidemia  4/7/2022       LDL  75   HDL 35  Meds~ Lipitor   Plan~ continue to follow up with PCP for yearly labs     CKD  CRE 1.2  Nephrology managing    Cardiac clearance for urology  Will need to stop eliquis for 2 days prior to procedure   Okay to hold         Orders Placed This Encounter   Procedures    Cardiac event monitor         Porsha Treadwell MD    Follow up in 3 months to go over monitor results    Thank you for allowing to me to participate in the care of Oneli Cortes. Scribe's Attestation: This note was scribed in the presence of Dr. Veronica Miles MD by Ofelia Resendiz, NENO    I, Dr. Veronica Miles, personally performed the services described in this documentation, as scribed by the above signed scribe in my presence. It is both accurate and complete to my knowledge. I agree with the details independently gathered by the clinical support staff, while the remaining scribed note accurately describes my personal service to the patient.

## 2022-11-21 NOTE — PATIENT INSTRUCTIONS
You are doing well  Continue taking your medications as you have been  You are okay to hold Eliquis for 2 days prior to   Will do a 30 day monitor, if no afib we can stop eliquis  Follow up in 3 months

## 2022-12-09 ENCOUNTER — TELEPHONE (OUTPATIENT)
Dept: INTERVENTIONAL RADIOLOGY/VASCULAR | Age: 72
End: 2022-12-09

## 2022-12-09 ENCOUNTER — OFFICE VISIT (OUTPATIENT)
Dept: CARDIOLOGY CLINIC | Age: 72
End: 2022-12-09
Payer: MEDICARE

## 2022-12-09 VITALS
HEIGHT: 71 IN | DIASTOLIC BLOOD PRESSURE: 82 MMHG | BODY MASS INDEX: 30.24 KG/M2 | OXYGEN SATURATION: 99 % | WEIGHT: 216 LBS | SYSTOLIC BLOOD PRESSURE: 132 MMHG | HEART RATE: 76 BPM

## 2022-12-09 DIAGNOSIS — I48.0 PAROXYSMAL ATRIAL FIBRILLATION (HCC): Primary | ICD-10-CM

## 2022-12-09 DIAGNOSIS — E78.2 MIXED HYPERLIPIDEMIA: ICD-10-CM

## 2022-12-09 DIAGNOSIS — Z01.818 PREOPERATIVE CLEARANCE: ICD-10-CM

## 2022-12-09 DIAGNOSIS — N18.30 STAGE 3 CHRONIC KIDNEY DISEASE, UNSPECIFIED WHETHER STAGE 3A OR 3B CKD (HCC): ICD-10-CM

## 2022-12-09 DIAGNOSIS — I10 ESSENTIAL HYPERTENSION: ICD-10-CM

## 2022-12-09 PROCEDURE — 99214 OFFICE O/P EST MOD 30 MIN: CPT | Performed by: INTERNAL MEDICINE

## 2022-12-09 PROCEDURE — 3078F DIAST BP <80 MM HG: CPT | Performed by: INTERNAL MEDICINE

## 2022-12-09 PROCEDURE — 1123F ACP DISCUSS/DSCN MKR DOCD: CPT | Performed by: INTERNAL MEDICINE

## 2022-12-09 PROCEDURE — 3074F SYST BP LT 130 MM HG: CPT | Performed by: INTERNAL MEDICINE

## 2022-12-15 ENCOUNTER — ANESTHESIA EVENT (OUTPATIENT)
Dept: INTERVENTIONAL RADIOLOGY/VASCULAR | Age: 72
End: 2022-12-15

## 2022-12-15 ENCOUNTER — HOSPITAL ENCOUNTER (OUTPATIENT)
Dept: CT IMAGING | Age: 72
Discharge: HOME | End: 2022-12-15
Payer: MEDICARE

## 2022-12-15 ENCOUNTER — ANESTHESIA (OUTPATIENT)
Dept: INTERVENTIONAL RADIOLOGY/VASCULAR | Age: 72
End: 2022-12-15

## 2022-12-15 ENCOUNTER — HOSPITAL ENCOUNTER (OUTPATIENT)
Dept: INTERVENTIONAL RADIOLOGY/VASCULAR | Age: 72
Discharge: HOME | End: 2022-12-15
Payer: MEDICARE

## 2022-12-15 VITALS
HEART RATE: 79 BPM | TEMPERATURE: 97 F | RESPIRATION RATE: 16 BRPM | SYSTOLIC BLOOD PRESSURE: 123 MMHG | OXYGEN SATURATION: 99 % | DIASTOLIC BLOOD PRESSURE: 59 MMHG

## 2022-12-15 DIAGNOSIS — N28.89 LEFT RENAL MASS: ICD-10-CM

## 2022-12-15 DIAGNOSIS — I10 ESSENTIAL HYPERTENSION: ICD-10-CM

## 2022-12-15 DIAGNOSIS — R31.0 GROSS HEMATURIA: ICD-10-CM

## 2022-12-15 DIAGNOSIS — N28.89 LEFT RENAL MASS: Primary | ICD-10-CM

## 2022-12-15 LAB
ANION GAP SERPL CALCULATED.3IONS-SCNC: 8 MMOL/L (ref 3–16)
APTT: 40 SEC (ref 23–34.3)
BUN BLDV-MCNC: 25 MG/DL (ref 7–20)
CALCIUM SERPL-MCNC: 9.9 MG/DL (ref 8.3–10.6)
CHLORIDE BLD-SCNC: 106 MMOL/L (ref 99–110)
CO2: 26 MMOL/L (ref 21–32)
CREAT SERPL-MCNC: 1.1 MG/DL (ref 0.8–1.3)
EKG ATRIAL RATE: 80 BPM
EKG DIAGNOSIS: NORMAL
EKG P AXIS: 42 DEGREES
EKG P-R INTERVAL: 172 MS
EKG Q-T INTERVAL: 384 MS
EKG QRS DURATION: 82 MS
EKG QTC CALCULATION (BAZETT): 442 MS
EKG R AXIS: 18 DEGREES
EKG T AXIS: 46 DEGREES
EKG VENTRICULAR RATE: 80 BPM
GFR SERPL CREATININE-BSD FRML MDRD: >60 ML/MIN/{1.73_M2}
GLUCOSE BLD-MCNC: 103 MG/DL (ref 70–99)
HCT VFR BLD CALC: 38.6 % (ref 40.5–52.5)
HEMOGLOBIN: 12.9 G/DL (ref 13.5–17.5)
INR BLD: 1.11 (ref 0.87–1.14)
MCH RBC QN AUTO: 30.6 PG (ref 26–34)
MCHC RBC AUTO-ENTMCNC: 33.3 G/DL (ref 31–36)
MCV RBC AUTO: 92 FL (ref 80–100)
PDW BLD-RTO: 13.7 % (ref 12.4–15.4)
PLATELET # BLD: 255 K/UL (ref 135–450)
PMV BLD AUTO: 7.7 FL (ref 5–10.5)
POTASSIUM SERPL-SCNC: 4.2 MMOL/L (ref 3.5–5.1)
PROTHROMBIN TIME: 14.2 SEC (ref 11.7–14.5)
RBC # BLD: 4.2 M/UL (ref 4.2–5.9)
SODIUM BLD-SCNC: 140 MMOL/L (ref 136–145)
WBC # BLD: 6.5 K/UL (ref 4–11)

## 2022-12-15 PROCEDURE — 80048 BASIC METABOLIC PNL TOTAL CA: CPT

## 2022-12-15 PROCEDURE — 85730 THROMBOPLASTIN TIME PARTIAL: CPT

## 2022-12-15 PROCEDURE — 2500000003 HC RX 250 WO HCPCS: Performed by: NURSE ANESTHETIST, CERTIFIED REGISTERED

## 2022-12-15 PROCEDURE — 7100000000 HC PACU RECOVERY - FIRST 15 MIN

## 2022-12-15 PROCEDURE — 7100000011 HC PHASE II RECOVERY - ADDTL 15 MIN

## 2022-12-15 PROCEDURE — C2618 PROBE/NEEDLE, CRYO: HCPCS

## 2022-12-15 PROCEDURE — 7100000001 HC PACU RECOVERY - ADDTL 15 MIN

## 2022-12-15 PROCEDURE — 36415 COLL VENOUS BLD VENIPUNCTURE: CPT

## 2022-12-15 PROCEDURE — 88305 TISSUE EXAM BY PATHOLOGIST: CPT

## 2022-12-15 PROCEDURE — 7100000010 HC PHASE II RECOVERY - FIRST 15 MIN

## 2022-12-15 PROCEDURE — 6360000002 HC RX W HCPCS: Performed by: NURSE ANESTHETIST, CERTIFIED REGISTERED

## 2022-12-15 PROCEDURE — 3700000000 HC ANESTHESIA ATTENDED CARE

## 2022-12-15 PROCEDURE — 85027 COMPLETE CBC AUTOMATED: CPT

## 2022-12-15 PROCEDURE — 3700000001 HC ADD 15 MINUTES (ANESTHESIA)

## 2022-12-15 PROCEDURE — 6360000002 HC RX W HCPCS: Performed by: ANESTHESIOLOGY

## 2022-12-15 PROCEDURE — 50593 PERC CRYO ABLATE RENAL TUM: CPT

## 2022-12-15 PROCEDURE — 93005 ELECTROCARDIOGRAM TRACING: CPT | Performed by: STUDENT IN AN ORGANIZED HEALTH CARE EDUCATION/TRAINING PROGRAM

## 2022-12-15 PROCEDURE — 2580000003 HC RX 258: Performed by: STUDENT IN AN ORGANIZED HEALTH CARE EDUCATION/TRAINING PROGRAM

## 2022-12-15 PROCEDURE — 2580000003 HC RX 258: Performed by: NURSE ANESTHETIST, CERTIFIED REGISTERED

## 2022-12-15 PROCEDURE — 6360000002 HC RX W HCPCS: Performed by: STUDENT IN AN ORGANIZED HEALTH CARE EDUCATION/TRAINING PROGRAM

## 2022-12-15 PROCEDURE — 85610 PROTHROMBIN TIME: CPT

## 2022-12-15 RX ORDER — OXYCODONE HYDROCHLORIDE AND ACETAMINOPHEN 5; 325 MG/1; MG/1
1 TABLET ORAL EVERY 6 HOURS PRN
Qty: 12 TABLET | Refills: 0 | Status: SHIPPED | OUTPATIENT
Start: 2022-12-15 | End: 2022-12-18

## 2022-12-15 RX ORDER — PROPOFOL 10 MG/ML
INJECTION, EMULSION INTRAVENOUS PRN
Status: DISCONTINUED | OUTPATIENT
Start: 2022-12-15 | End: 2022-12-15 | Stop reason: SDUPTHER

## 2022-12-15 RX ORDER — ROCURONIUM BROMIDE 10 MG/ML
INJECTION, SOLUTION INTRAVENOUS PRN
Status: DISCONTINUED | OUTPATIENT
Start: 2022-12-15 | End: 2022-12-15 | Stop reason: SDUPTHER

## 2022-12-15 RX ORDER — HYDROMORPHONE HCL 110MG/55ML
0.5 PATIENT CONTROLLED ANALGESIA SYRINGE INTRAVENOUS EVERY 5 MIN PRN
Status: DISCONTINUED | OUTPATIENT
Start: 2022-12-15 | End: 2022-12-16 | Stop reason: HOSPADM

## 2022-12-15 RX ORDER — ONDANSETRON 2 MG/ML
INJECTION INTRAMUSCULAR; INTRAVENOUS PRN
Status: DISCONTINUED | OUTPATIENT
Start: 2022-12-15 | End: 2022-12-15 | Stop reason: SDUPTHER

## 2022-12-15 RX ORDER — DEXAMETHASONE SODIUM PHOSPHATE 4 MG/ML
INJECTION, SOLUTION INTRA-ARTICULAR; INTRALESIONAL; INTRAMUSCULAR; INTRAVENOUS; SOFT TISSUE PRN
Status: DISCONTINUED | OUTPATIENT
Start: 2022-12-15 | End: 2022-12-15 | Stop reason: SDUPTHER

## 2022-12-15 RX ORDER — MEPERIDINE HYDROCHLORIDE 25 MG/ML
12.5 INJECTION INTRAMUSCULAR; INTRAVENOUS; SUBCUTANEOUS EVERY 5 MIN PRN
Status: DISCONTINUED | OUTPATIENT
Start: 2022-12-15 | End: 2022-12-16 | Stop reason: HOSPADM

## 2022-12-15 RX ORDER — LIDOCAINE HYDROCHLORIDE 20 MG/ML
INJECTION, SOLUTION EPIDURAL; INFILTRATION; INTRACAUDAL; PERINEURAL PRN
Status: DISCONTINUED | OUTPATIENT
Start: 2022-12-15 | End: 2022-12-15 | Stop reason: SDUPTHER

## 2022-12-15 RX ORDER — DIPHENHYDRAMINE HYDROCHLORIDE 50 MG/ML
12.5 INJECTION INTRAMUSCULAR; INTRAVENOUS
Status: DISCONTINUED | OUTPATIENT
Start: 2022-12-15 | End: 2022-12-16 | Stop reason: HOSPADM

## 2022-12-15 RX ORDER — ONDANSETRON 2 MG/ML
4 INJECTION INTRAMUSCULAR; INTRAVENOUS
Status: DISCONTINUED | OUTPATIENT
Start: 2022-12-15 | End: 2022-12-16 | Stop reason: HOSPADM

## 2022-12-15 RX ORDER — SODIUM CHLORIDE 9 MG/ML
INJECTION, SOLUTION INTRAVENOUS CONTINUOUS PRN
Status: DISCONTINUED | OUTPATIENT
Start: 2022-12-15 | End: 2022-12-15 | Stop reason: SDUPTHER

## 2022-12-15 RX ORDER — FENTANYL CITRATE 50 UG/ML
INJECTION, SOLUTION INTRAMUSCULAR; INTRAVENOUS PRN
Status: DISCONTINUED | OUTPATIENT
Start: 2022-12-15 | End: 2022-12-15 | Stop reason: SDUPTHER

## 2022-12-15 RX ORDER — SUCCINYLCHOLINE/SOD CL,ISO/PF 200MG/10ML
SYRINGE (ML) INTRAVENOUS PRN
Status: DISCONTINUED | OUTPATIENT
Start: 2022-12-15 | End: 2022-12-15 | Stop reason: SDUPTHER

## 2022-12-15 RX ORDER — SODIUM CHLORIDE 0.9 % (FLUSH) 0.9 %
5-40 SYRINGE (ML) INJECTION PRN
Status: DISCONTINUED | OUTPATIENT
Start: 2022-12-15 | End: 2022-12-16 | Stop reason: HOSPADM

## 2022-12-15 RX ORDER — SODIUM CHLORIDE 9 MG/ML
INJECTION, SOLUTION INTRAVENOUS PRN
Status: DISCONTINUED | OUTPATIENT
Start: 2022-12-15 | End: 2022-12-16 | Stop reason: HOSPADM

## 2022-12-15 RX ORDER — SODIUM CHLORIDE 0.9 % (FLUSH) 0.9 %
5-40 SYRINGE (ML) INJECTION EVERY 12 HOURS SCHEDULED
Status: DISCONTINUED | OUTPATIENT
Start: 2022-12-15 | End: 2022-12-16 | Stop reason: HOSPADM

## 2022-12-15 RX ORDER — HYDROMORPHONE HCL 110MG/55ML
0.25 PATIENT CONTROLLED ANALGESIA SYRINGE INTRAVENOUS EVERY 5 MIN PRN
Status: DISCONTINUED | OUTPATIENT
Start: 2022-12-15 | End: 2022-12-16 | Stop reason: HOSPADM

## 2022-12-15 RX ADMIN — DEXAMETHASONE SODIUM PHOSPHATE 8 MG: 4 INJECTION, SOLUTION INTRAMUSCULAR; INTRAVENOUS at 10:53

## 2022-12-15 RX ADMIN — SODIUM CHLORIDE: 9 INJECTION, SOLUTION INTRAVENOUS at 08:25

## 2022-12-15 RX ADMIN — SUGAMMADEX 200 MG: 100 INJECTION, SOLUTION INTRAVENOUS at 10:59

## 2022-12-15 RX ADMIN — PHENYLEPHRINE HYDROCHLORIDE 100 MCG: 10 INJECTION INTRAVENOUS at 10:51

## 2022-12-15 RX ADMIN — ROCURONIUM BROMIDE 10 MG: 10 INJECTION, SOLUTION INTRAVENOUS at 10:15

## 2022-12-15 RX ADMIN — ROCURONIUM BROMIDE 50 MG: 10 INJECTION, SOLUTION INTRAVENOUS at 08:50

## 2022-12-15 RX ADMIN — HYDROMORPHONE HYDROCHLORIDE 0.5 MG: 2 INJECTION, SOLUTION INTRAMUSCULAR; INTRAVENOUS; SUBCUTANEOUS at 11:47

## 2022-12-15 RX ADMIN — ONDANSETRON 4 MG: 2 INJECTION INTRAMUSCULAR; INTRAVENOUS at 10:53

## 2022-12-15 RX ADMIN — PROPOFOL 150 MG: 10 INJECTION, EMULSION INTRAVENOUS at 08:34

## 2022-12-15 RX ADMIN — FENTANYL CITRATE 25 MCG: 50 INJECTION, SOLUTION INTRAMUSCULAR; INTRAVENOUS at 11:08

## 2022-12-15 RX ADMIN — PHENYLEPHRINE HYDROCHLORIDE 200 MCG: 10 INJECTION INTRAVENOUS at 09:50

## 2022-12-15 RX ADMIN — Medication 140 MG: at 08:34

## 2022-12-15 RX ADMIN — HYDROMORPHONE HYDROCHLORIDE 0.5 MG: 2 INJECTION, SOLUTION INTRAMUSCULAR; INTRAVENOUS; SUBCUTANEOUS at 11:52

## 2022-12-15 RX ADMIN — HYDROMORPHONE HYDROCHLORIDE 0.5 MG: 2 INJECTION, SOLUTION INTRAMUSCULAR; INTRAVENOUS; SUBCUTANEOUS at 11:39

## 2022-12-15 RX ADMIN — PHENYLEPHRINE HYDROCHLORIDE 100 MCG: 10 INJECTION INTRAVENOUS at 09:42

## 2022-12-15 RX ADMIN — CEFTRIAXONE 2000 MG: 2 INJECTION, POWDER, FOR SOLUTION INTRAMUSCULAR; INTRAVENOUS at 08:08

## 2022-12-15 RX ADMIN — FENTANYL CITRATE 25 MCG: 50 INJECTION, SOLUTION INTRAMUSCULAR; INTRAVENOUS at 11:14

## 2022-12-15 RX ADMIN — LIDOCAINE HYDROCHLORIDE 100 MG: 20 INJECTION, SOLUTION EPIDURAL; INFILTRATION; INTRACAUDAL; PERINEURAL at 08:34

## 2022-12-15 RX ADMIN — ROCURONIUM BROMIDE 10 MG: 10 INJECTION, SOLUTION INTRAVENOUS at 09:44

## 2022-12-15 RX ADMIN — ROCURONIUM BROMIDE 10 MG: 10 INJECTION, SOLUTION INTRAVENOUS at 10:45

## 2022-12-15 RX ADMIN — PHENYLEPHRINE HYDROCHLORIDE 100 MCG: 10 INJECTION INTRAVENOUS at 09:37

## 2022-12-15 RX ADMIN — FENTANYL CITRATE 50 MCG: 50 INJECTION, SOLUTION INTRAMUSCULAR; INTRAVENOUS at 11:05

## 2022-12-15 ASSESSMENT — PAIN DESCRIPTION - DESCRIPTORS: DESCRIPTORS: ACHING

## 2022-12-15 ASSESSMENT — PAIN DESCRIPTION - LOCATION
LOCATION: FLANK
LOCATION: FLANK

## 2022-12-15 ASSESSMENT — PAIN SCALES - GENERAL
PAINLEVEL_OUTOF10: 7
PAINLEVEL_OUTOF10: 7
PAINLEVEL_OUTOF10: 5
PAINLEVEL_OUTOF10: 3
PAINLEVEL_OUTOF10: 0
PAINLEVEL_OUTOF10: 7
PAINLEVEL_OUTOF10: 0

## 2022-12-15 ASSESSMENT — PAIN DESCRIPTION - PAIN TYPE
TYPE: SURGICAL PAIN
TYPE: SURGICAL PAIN

## 2022-12-15 ASSESSMENT — PAIN DESCRIPTION - ORIENTATION
ORIENTATION: LEFT
ORIENTATION: LEFT

## 2022-12-15 NOTE — DISCHARGE INSTRUCTIONS
IR Renal Ablation Instructions    12/15/2022 Successful CT guided biopsy of left renal mass at [facility] with Dr. Matthew Wang (radiologist), as ordered by Dr. Noé Quispe (urologist)       Discharge Instructions  You had a procedure called renal ablation. Your doctor used a special needle to collect a small amount of tissue to examine it for signs of damage or disease. Here's what to do following the procedure. Home Care  Dont drive for 24 to 48 hours after the procedure. Remove the bandage covering the ablation site 24 to 48 hours after the procedure. Dont shower for 24 hours after the ablation. If you wish, you may wash yourself with a sponge or washcloth. When you are able to shower, dont scrub the site. Gently wash the area and pat it dry. Dont lift anything heavier than 10 pounds for 3 to 4 days after the procedure. Ask your doctor when you can return to work. Most patients are able to go back to work within 24 to 48 hours of the procedure. Follow-Up  Make a follow-up appointment as directed by our staff with the physician who ordered the procedure  When to Call Your Doctor  Call your doctor right away if you have any of the following:  Fever above 101°F (38.3°C) or shaking chills  Bleeding, drainage, or an opening at the ablation puncture site  Increasing redness, tenderness, or swelling at the ablation puncture site  Increasing pain, with or without activity  Increasingly bloody urine  New or unusual swelling or pain in one or both of your legs or calves    Standard Post-Sedation instructions: For mild pain medication, take Tylenol, unless you have liver failure. Some bloody urine (light red or pink) is expected for two to three days and should gradually clear. If bloody urine is excessive (solid red) or persists longer than two-three days, then contact the radiologist you saw today or your ordering/prescribing provider.      Follow up with the ordering provider for questions, concerns, and/or results. Dr. Kam Prom  [phone]       You should have follow-up abdominal scans already scheduled for every 6 months from now for a total of three scans. You may receive a phone call from a nurse or tech to check on you in the next couple of days. The interventional radiologist you saw today does not usually follow-up with you after your procedure. He/she does not change or prescribe medications or treatments. All questions after today should be answered by your prescribing physician, Dr. Kam Prom  [phone]. You may resume any blood thinners or anticoagulants (Plavix, coumadin, aspirin, Eliquis, etc.)          tomorrow unless otherwise instructed. The interventional radiologist you saw today is Dr. Elizabeth Stafford, for your records.

## 2022-12-15 NOTE — PRE SEDATION
Sedation Pre-Procedure Note    Patient Name: Kenisha Trujillo   YOB: 1950  Room/Bed: Room/bed info not found  Medical Record Number: [id number]  Date: 12/15/2022   Time: 8:34 AM       Indication:  pt with left renal mass here for biopsy and ablation. Consent: I have discussed with the patient and/or the patient representative the indication, alternatives, and the possible risks and/or complications of the planned procedure and the anesthesia methods. The patient and/or patient representative appear to understand and agree to proceed. Vital Signs:   Vitals:    12/15/22 0752   BP: (!) 208/114   Pulse: 76   Resp: 16   Temp: 97.1 °F (36.2 °C)   SpO2: 99%       Past Medical History:   has a past medical history of Permanent atrial fibrillation ([facility] [address] Postural urinary incontinence, Primary hypertension, and Prostate cancer ([facility] [address] Past Surgical History:   has no past surgical history on file. Medications:   Scheduled Meds:    cefTRIAXone (ROCEPHIN) IV  2,000 mg IntraVENous Once     Continuous Infusions:   PRN Meds:   Home Meds:   Prior to Admission medications    Medication Sig Start Date End Date Taking?  Authorizing Provider   tolterodine (DETROL LA) 4 MG extended release capsule TAKE 1 CAPSULE DAILY 10/24/22   Toribio Flatness, MD   chlorthalidone (HYGROTON) 25 MG tablet Take 1 tablet by mouth daily 9/19/22   Toribio Flatness, MD   carvedilol (COREG) 12.5 MG tablet TAKE 1 TABLET TWICE DAILY  WITH MEALS 7/5/22   Toribio Flatness, MD   potassium chloride (KLOR-CON M) 20 MEQ extended release tablet TAKE 1 TABLET TWICE A DAY 7/5/22   Toribio Flatness, MD   dilTIAZem (TIAZAC) 240 MG extended release capsule TAKE 1 CAPSULE DAILY 7/5/22   Toribio Flatness, MD   atorvastatin (LIPITOR) 20 MG tablet TAKE 1 TABLET NIGHTLY 7/5/22   Toribio Flatness, MD   ELIQUIS 5 MG TABS tablet TAKE 1 TABLET TWICE A DAY 7/5/22   Toribio Flatness, MD   losartan (COZAAR) 50 MG tablet Take 1 tablet by mouth 2 times daily 2/7/22 2/7/23  Colby BOND MD Hilda   vitamin D 25 MCG (1000 UT) CAPS Take 1,000 Units by mouth daily    Historical Provider, MD   coenzyme Q10 200 MG CAPS capsule Take 200 mg by mouth daily    Historical Provider, MD   cyanocobalamin 500 MCG tablet Take 1,000 mcg by mouth daily    Historical Provider, MD   acetaminophen (TYLENOL) 500 MG tablet Take 1,000 mg by mouth 2 times daily    Historical Provider, MD     Coumadin Use Last 7 Days:  no  Antiplatelet drug therapy use last 7 days: no  Other anticoagulant use last 7 days: no  Additional Medication Information:  none     Pre-Sedation Documentation and Exam:   I have reviewed the patient's history and review of systems.     Mallampati Airway Assessment:  As per anesthesia    Prior History of Anesthesia Complications:   none    ASA Classification:  As per anesthesia    Sedation/ Anesthesia Plan:   general    Medications Planned:   As per anesthesia    Patient is an appropriate candidate for plan of sedation: yes    Electronically signed by Don Jeffrey MD on 12/15/2022 at 8:34 AM

## 2022-12-15 NOTE — BRIEF OP NOTE
Brief Postoperative Note    Nallely Hernandez  YOB: 1950  [id number]    Pre-operative Diagnosis: left renal mass    Post-operative Diagnosis: Same    Procedure: image guided biopsy and ablation    Anesthesia: General    Surgeons/Assistants: Jackson Hammonds MD    Estimated Blood Loss: less than 5    Complications: None    Specimens: Was Obtained: 3 core samples    Findings: successful CT guided biopsy of left renal mass. Successful CT guided cryoablation of left renal mass.     Electronically signed by Jackson Hammonds MD on 12/15/2022 at 10:55 AM

## 2022-12-15 NOTE — ANESTHESIA PRE PROCEDURE
chloride (KLOR-CON M) 20 MEQ extended release tablet TAKE 1 TABLET TWICE A  tablet 1    dilTIAZem (TIAZAC) 240 MG extended release capsule TAKE 1 CAPSULE DAILY 90 capsule 1    atorvastatin (LIPITOR) 20 MG tablet TAKE 1 TABLET NIGHTLY 90 tablet 1    ELIQUIS 5 MG TABS tablet TAKE 1 TABLET TWICE A  tablet 1    losartan (COZAAR) 50 MG tablet Take 1 tablet by mouth 2 times daily 180 tablet 11    vitamin D 25 MCG (1000 UT) CAPS Take 1,000 Units by mouth daily      coenzyme Q10 200 MG CAPS capsule Take 200 mg by mouth daily      cyanocobalamin 500 MCG tablet Take 1,000 mcg by mouth daily      acetaminophen (TYLENOL) 500 MG tablet Take 1,000 mg by mouth 2 times daily       Current Facility-Administered Medications   Medication Dose Route Frequency Provider Last Rate Last Admin    cefTRIAXone (ROCEPHIN) 2,000 mg in dextrose 5 % 50 mL IVPB mini-bag  2,000 mg IntraVENous Once Madelyn Snider MD           Allergies:     Allergies   Allergen Reactions    Enoxaparin Other (See Comments)     Causes clotting    Lisinopril     Pravachol [Pravastatin]     Tuberculin Ppd Other (See Comments)     Redness and swelling of arm, negative chest xrays  Redness and swelling of arm, negative chest xrays      Morphine Nausea And Vomiting       Problem List:    Patient Active Problem List   Diagnosis Code    Essential hypertension I10    Prostate cancer ([facility] [address] C61    Osteoarthritis of right knee M17.11    Permanent atrial fibrillation ([facility] [address] I48.21    Postural urinary incontinence N39.492    B12 deficiency E53.8    Adenocarcinoma of prostate ([facility] [address] C61    CKD (chronic kidney disease) stage 3, GFR 30-59 ml/min (HCC) N18.30    Gait disturbance R26.9    Hyperlipidemia E78.5    Mixed stress and urge urinary incontinence N39.46    Paroxysmal atrial fibrillation (HCC) I48.0    Hypertension goal BP (blood pressure) < 130/80 I10    Preoperative clearance Z01.818       Past Medical History:        Diagnosis Date  Permanent atrial fibrillation ([facility] 10/6/2021    Postural urinary incontinence 10/6/2021    Status post prostatectomy patient has a device that is controlling it    Primary hypertension 10/6/2021    Prostate cancer ([facility] 10/6/2021       Past Surgical History:  No past surgical history on file. Social History:    Social History     Tobacco Use    Smoking status: Never    Smokeless tobacco: Never   Substance Use Topics    Alcohol use: Never                                Counseling given: Not Answered      Vital Signs (Current): There were no vitals filed for this visit.                                            BP Readings from Last 3 Encounters:   12/09/22 132/82   11/17/22 122/68   09/19/22 134/72       NPO Status:                                                                                 BMI:   Wt Readings from Last 3 Encounters:   12/09/22 216 lb (98 kg)   11/17/22 210 lb (95.3 kg)   09/19/22 218 lb 6.4 oz (99.1 kg)     There is no height or weight on file to calculate BMI.    CBC:   Lab Results   Component Value Date/Time    WBC 6.8 04/19/2022 09:42 AM    RBC 4.33 04/19/2022 09:42 AM    HGB 13.2 04/19/2022 09:42 AM    HCT 39.3 04/19/2022 09:42 AM    MCV 90.7 04/19/2022 09:42 AM    RDW 14.5 04/19/2022 09:42 AM     04/19/2022 09:42 AM       CMP:   Lab Results   Component Value Date/Time     04/07/2022 09:56 AM    K 4.5 04/07/2022 09:56 AM     04/07/2022 09:56 AM    CO2 22 04/07/2022 09:56 AM    BUN 27 09/17/2022 01:00 PM    CREATININE 1.2 09/17/2022 01:00 PM    GFRAA >60 09/17/2022 01:00 PM    AGRATIO 1.5 04/07/2022 09:56 AM    LABGLOM 59 09/17/2022 01:00 PM    GLUCOSE 88 04/07/2022 09:56 AM    PROT 7.2 04/07/2022 09:56 AM    CALCIUM 9.4 04/07/2022 09:56 AM    BILITOT 0.5 04/07/2022 09:56 AM    ALKPHOS 111 04/07/2022 09:56 AM    AST 16 04/07/2022 09:56 AM    ALT 18 04/07/2022 09:56 AM       POC Tests: No results for input(s): POCGLU, POCNA, POCK, POCCL, POCBUN, POCHEMO, POCHCT in the last 72 hours. Coags: No results found for: PROTIME, INR, APTT    HCG (If Applicable): No results found for: PREGTESTUR, PREGSERUM, HCG, HCGQUANT     ABGs: No results found for: PHART, PO2ART, UAC5XRP, FNF0KSC, BEART, Z5VKFOKV     Type & Screen (If Applicable):  No results found for: LABABO, LABRH    Drug/Infectious Status (If Applicable):  No results found for: HIV, HEPCAB    COVID-19 Screening (If Applicable): No results found for: COVID19        Anesthesia Evaluation  Patient summary reviewed and Nursing notes reviewed  Airway: Mallampati: II  TM distance: >3 FB   Neck ROM: full  Mouth opening: > = 3 FB   Dental:          Pulmonary:                              Cardiovascular:  Exercise tolerance: poor (<4 METS),   (+) hypertension: moderate,                   Neuro/Psych:               GI/Hepatic/Renal:             Endo/Other:                     Abdominal:             Vascular: Other Findings:           Anesthesia Plan      general     ASA 2       Induction: intravenous. MIPS: Postoperative opioids intended, Prophylactic antiemetics administered and Postoperative trial extubation. Anesthetic plan and risks discussed with patient. Plan discussed with CRNA.                     Megan Vanessa MD   12/15/2022

## 2022-12-15 NOTE — PROGRESS NOTES
Pt arrived to PACU from OR in stable condition and is alert to verbal stimuli. Pt can move extremities to command. Respirations are even on 5L O2 per Nonrebreather. Skin warm, dry, and with appropriate for ethnicity color. Abd is soft. Pain is tolerable at this time.   Left low back surgical site(s) intact with dressing= no closure, three \"pokes\" under one guaze and tegaderm dressing per team report; CDI      S/P: left side renal cryoablation and biopsy with Dr. Dave Davis at [facility].      [address], RN  Pre-Op/Recovery   Same Day Surgery

## 2022-12-15 NOTE — PROGRESS NOTES
Pt meets d/c criteria for phase 1 in PACU and has been seen by anesthesia. Ok to transition to phase 2 care. Will alert anyone in waiting room for them and the nursing unit if applicable. Will continue to monitor for safety and comfort.     Ely SWENSONN, RN  Pre-Op/Recovery   Same Day Surgery

## 2022-12-19 ENCOUNTER — PATIENT MESSAGE (OUTPATIENT)
Dept: CARDIOLOGY CLINIC | Age: 72
End: 2022-12-19

## 2022-12-20 ENCOUNTER — TELEPHONE (OUTPATIENT)
Dept: CARDIOLOGY CLINIC | Age: 72
End: 2022-12-20

## 2022-12-20 ENCOUNTER — PATIENT MESSAGE (OUTPATIENT)
Dept: CARDIOLOGY CLINIC | Age: 72
End: 2022-12-20

## 2022-12-20 NOTE — TELEPHONE ENCOUNTER
From: Lynne Willams  To: Dr. Leandro Alfaro: 12/20/2022 6:38 AM EST  Subject: Cardiac Monitor     After about an hour, I got the monitor to function properly, and it seems fine this morning. Sorry, dont know what was wrong.

## 2022-12-20 NOTE — TELEPHONE ENCOUNTER
Called pt about the message below and patient wife verbalized understanding and will call the number on the box to get more patches.

## 2022-12-20 NOTE — TELEPHONE ENCOUNTER
From: Jasper Bring  To: Dr. Chambers Mullins: 12/19/2022 9:04 PM EST  Subject: Cardiac Monitor     I took a shower tonight, and replaced the  Monitor lead exactly as instructed. Since then, about every 30 minutes, it has alarmed for poor contact with the chest, even though this is obviously not the case. I will get no sleep at all, and see no choice other than to remove the monitor. Your advice would be appreciated.

## 2022-12-20 NOTE — TELEPHONE ENCOUNTER
Hortencia Cota called to let the office know that she needs more patches for the Pt monitor. Please call to discuss how to get them.   Please advise

## 2023-01-08 PROBLEM — Z01.818 PREOPERATIVE CLEARANCE: Status: RESOLVED | Noted: 2022-12-09 | Resolved: 2023-01-08

## 2023-01-13 ENCOUNTER — PATIENT MESSAGE (OUTPATIENT)
Dept: INTERNAL MEDICINE CLINIC | Age: 73
End: 2023-01-13

## 2023-01-16 NOTE — TELEPHONE ENCOUNTER
From: Jez Shannon  To: Dr. Cuca Jones: 1/13/2023 7:26 PM EST  Subject: Cardiac Monitor     I got the results back from my 30 day cardiac monitoring. Dr. Eddie Alan said that I need to remain on Eliquis. (I had 5% A- Fib occurrence, but no symptoms). I need to request that you renew my Eliquis, as I have no remaining refills. Laurie Brunner and are well. Looking forward to seeing you!

## 2023-01-23 RX ORDER — LOSARTAN POTASSIUM 50 MG/1
TABLET ORAL
Qty: 180 TABLET | Refills: 3 | Status: SHIPPED | OUTPATIENT
Start: 2023-01-23

## 2023-02-17 RX ORDER — CARVEDILOL 12.5 MG/1
TABLET ORAL
Qty: 180 TABLET | Refills: 1 | Status: SHIPPED | OUTPATIENT
Start: 2023-02-17

## 2023-02-17 RX ORDER — POTASSIUM CHLORIDE 1500 MG/1
TABLET, EXTENDED RELEASE ORAL
Qty: 180 TABLET | Refills: 1 | Status: SHIPPED | OUTPATIENT
Start: 2023-02-17

## 2023-03-16 ENCOUNTER — HOSPITAL ENCOUNTER (OUTPATIENT)
Dept: MRI IMAGING | Age: 73
Discharge: HOME | End: 2023-03-16
Payer: MEDICARE

## 2023-03-16 DIAGNOSIS — N28.89 OTHER SPECIFIED DISORDERS OF KIDNEY AND URETER: ICD-10-CM

## 2023-03-16 LAB
BUN SERPL-MCNC: 30 MG/DL (ref 7–20)
CREAT SERPL-MCNC: 1.3 MG/DL (ref 0.8–1.3)
GFR SERPLBLD CREATININE-BSD FMLA CKD-EPI: 58 ML/MIN/{1.73_M2}

## 2023-03-16 PROCEDURE — A9577 INJ MULTIHANCE: HCPCS | Performed by: INTERNAL MEDICINE

## 2023-03-16 PROCEDURE — 82565 ASSAY OF CREATININE: CPT

## 2023-03-16 PROCEDURE — 6360000004 HC RX CONTRAST MEDICATION: Performed by: INTERNAL MEDICINE

## 2023-03-16 PROCEDURE — 74183 MRI ABD W/O CNTR FLWD CNTR: CPT

## 2023-03-16 PROCEDURE — 36415 COLL VENOUS BLD VENIPUNCTURE: CPT

## 2023-03-16 PROCEDURE — 84520 ASSAY OF UREA NITROGEN: CPT

## 2023-03-16 PROCEDURE — 2580000003 HC RX 258: Performed by: INTERNAL MEDICINE

## 2023-03-16 RX ORDER — SODIUM CHLORIDE 9 MG/ML
INJECTION, SOLUTION INTRAVENOUS ONCE
Status: COMPLETED | OUTPATIENT
Start: 2023-03-16 | End: 2023-03-16

## 2023-03-16 RX ADMIN — GADOBENATE DIMEGLUMINE 18 ML: 529 INJECTION, SOLUTION INTRAVENOUS at 10:54

## 2023-03-16 RX ADMIN — SODIUM CHLORIDE: 9 INJECTION, SOLUTION INTRAVENOUS at 10:59

## 2023-03-23 ENCOUNTER — POST-OP TELEPHONE (OUTPATIENT)
Dept: INTERVENTIONAL RADIOLOGY/VASCULAR | Age: 73
End: 2023-03-23

## 2023-03-23 DIAGNOSIS — N28.89 RENAL MASS: Primary | ICD-10-CM

## 2023-03-23 NOTE — PROGRESS NOTES
Letter was sent to patient to inform them that follow up MRI abdomen with renal protocol is needed to assess satisfactory tumor ablation. Order placed in epic.

## 2023-03-28 DIAGNOSIS — E78.2 MIXED HYPERLIPIDEMIA: Primary | ICD-10-CM

## 2023-03-28 NOTE — PATIENT INSTRUCTIONS
Thank you for coming to see us today   You are doing well   No changes to your medications today   Continue to be active as you tolerate   Follow up in 6 months

## 2023-03-28 NOTE — PROGRESS NOTES
[facility] ([place]   Cardiac Evaluation      Patient: Neda Garcia  YOB: 1950         Chief Complaint   Patient presents with    3 Month Follow-Up     No c/c            Referring provider: Migdalia Beck MD    History of Present Illness:   Neda Garcia is a 75 y.o. male here for one year follow up. Established care with me in 2021 after moving here from [place]. He is a retired nurse. Mr. Mariel Cortes was supposed to see me 2 weeks ago but unfortunately had post op complications following cystoscopy. Today he reports he is doing well. No cardiac complaint. Is able to be more active since last visit, walking more. PMH Afib, HTN, HLD, CKD. With regard to medication therapy he/she has been compliant with prescribed regimen and has tolerated therapy to date. Past Medical History:   has a past medical history of Permanent atrial fibrillation ([facility] [address] Postural urinary incontinence, Primary hypertension, and Prostate cancer ([facility] [address] Surgical History:   has a past surgical history that includes CT CRYOABLATION OF RENAL TUMOR (12/15/2022).      Current Outpatient Medications   Medication Sig Dispense Refill    KLOR-CON M20 20 MEQ extended release tablet TAKE 1 TABLET TWICE A  tablet 1    carvedilol (COREG) 12.5 MG tablet TAKE 1 TABLET TWICE DAILY  WITH MEALS 180 tablet 1    losartan (COZAAR) 50 MG tablet TAKE 1 TABLET TWICE A  tablet 3    apixaban (ELIQUIS) 5 MG TABS tablet TAKE 1 TABLET TWICE A  tablet 1    tolterodine (DETROL LA) 4 MG extended release capsule TAKE 1 CAPSULE DAILY 90 capsule 1    chlorthalidone (HYGROTON) 25 MG tablet Take 1 tablet by mouth daily 90 tablet 1    dilTIAZem (TIAZAC) 240 MG extended release capsule TAKE 1 CAPSULE DAILY 90 capsule 1    atorvastatin (LIPITOR) 20 MG tablet TAKE 1 TABLET NIGHTLY 90 tablet 1    vitamin D 25 MCG (1000 UT) CAPS Take 1 capsule by mouth daily      coenzyme Q10 200 MG CAPS capsule Take 1 capsule by

## 2023-03-30 PROBLEM — D50.9 IRON DEFICIENCY ANEMIA, UNSPECIFIED: Status: ACTIVE | Noted: 2023-03-30

## 2023-03-30 PROBLEM — I83.93 ASYMPTOMATIC VARICOSE VEINS OF BOTH LOWER EXTREMITIES: Status: ACTIVE | Noted: 2023-03-30

## 2023-03-30 PROBLEM — E66.3 OVERWEIGHT: Status: ACTIVE | Noted: 2023-03-30

## 2023-03-30 PROBLEM — Z91.81 PERSONAL HISTORY OF FALL: Status: ACTIVE | Noted: 2023-03-30

## 2023-03-30 PROBLEM — R26.81 UNSTEADINESS ON FEET: Status: ACTIVE | Noted: 2023-03-30

## 2023-03-30 PROBLEM — Z82.49 FAMILY HISTORY OF ISCHEMIC HEART DISEASE: Status: ACTIVE | Noted: 2023-03-30

## 2023-04-03 ENCOUNTER — OFFICE VISIT (OUTPATIENT)
Dept: CARDIOLOGY CLINIC | Age: 73
End: 2023-04-03
Payer: MEDICARE

## 2023-04-03 VITALS
OXYGEN SATURATION: 98 % | WEIGHT: 217 LBS | BODY MASS INDEX: 30.38 KG/M2 | HEART RATE: 80 BPM | SYSTOLIC BLOOD PRESSURE: 140 MMHG | HEIGHT: 71 IN | DIASTOLIC BLOOD PRESSURE: 72 MMHG

## 2023-04-03 DIAGNOSIS — I10 ESSENTIAL HYPERTENSION: Primary | ICD-10-CM

## 2023-04-03 DIAGNOSIS — N18.30 STAGE 3 CHRONIC KIDNEY DISEASE, UNSPECIFIED WHETHER STAGE 3A OR 3B CKD (HCC): ICD-10-CM

## 2023-04-03 DIAGNOSIS — I48.0 PAROXYSMAL ATRIAL FIBRILLATION (HCC): ICD-10-CM

## 2023-04-03 DIAGNOSIS — E78.2 MIXED HYPERLIPIDEMIA: ICD-10-CM

## 2023-04-03 PROCEDURE — 1123F ACP DISCUSS/DSCN MKR DOCD: CPT | Performed by: INTERNAL MEDICINE

## 2023-04-03 PROCEDURE — 3077F SYST BP >= 140 MM HG: CPT | Performed by: INTERNAL MEDICINE

## 2023-04-03 PROCEDURE — 99214 OFFICE O/P EST MOD 30 MIN: CPT | Performed by: INTERNAL MEDICINE

## 2023-04-03 PROCEDURE — 3078F DIAST BP <80 MM HG: CPT | Performed by: INTERNAL MEDICINE

## 2023-04-07 DIAGNOSIS — I10 ESSENTIAL HYPERTENSION: ICD-10-CM

## 2023-04-07 RX ORDER — CHLORTHALIDONE 25 MG/1
TABLET ORAL
Qty: 90 TABLET | Refills: 1 | Status: SHIPPED | OUTPATIENT
Start: 2023-04-07

## 2023-04-10 DIAGNOSIS — E78.2 MIXED HYPERLIPIDEMIA: ICD-10-CM

## 2023-04-10 DIAGNOSIS — R73.9 HYPERGLYCEMIA: ICD-10-CM

## 2023-04-10 DIAGNOSIS — D64.9 NORMOCHROMIC ANEMIA: ICD-10-CM

## 2023-04-10 DIAGNOSIS — I10 ESSENTIAL HYPERTENSION: ICD-10-CM

## 2023-04-10 LAB
ALBUMIN SERPL-MCNC: 4.6 G/DL (ref 3.4–5)
ALBUMIN/GLOB SERPL: 1.4 {RATIO} (ref 1.1–2.2)
ALP SERPL-CCNC: 113 U/L (ref 40–129)
ALT SERPL-CCNC: 17 U/L (ref 10–40)
ANION GAP SERPL CALCULATED.3IONS-SCNC: 15 MMOL/L (ref 3–16)
AST SERPL-CCNC: 15 U/L (ref 15–37)
BASOPHILS # BLD: 0 K/UL (ref 0–0.2)
BASOPHILS NFR BLD: 0.7 %
BILIRUB SERPL-MCNC: 0.5 MG/DL (ref 0–1)
BUN SERPL-MCNC: 21 MG/DL (ref 7–20)
CALCIUM SERPL-MCNC: 9.9 MG/DL (ref 8.3–10.6)
CHLORIDE SERPL-SCNC: 107 MMOL/L (ref 99–110)
CHOLEST SERPL-MCNC: 148 MG/DL (ref 0–199)
CO2 SERPL-SCNC: 21 MMOL/L (ref 21–32)
CREAT SERPL-MCNC: 1.4 MG/DL (ref 0.8–1.3)
DEPRECATED RDW RBC AUTO: 14 % (ref 12.4–15.4)
EOSINOPHIL # BLD: 0.1 K/UL (ref 0–0.6)
EOSINOPHIL NFR BLD: 1.8 %
FERRITIN SERPL IA-MCNC: 354.6 NG/ML (ref 30–400)
FOLATE SERPL-MCNC: >20 NG/ML (ref 4.78–24.2)
GFR SERPLBLD CREATININE-BSD FMLA CKD-EPI: 53 ML/MIN/{1.73_M2}
GLUCOSE SERPL-MCNC: 97 MG/DL (ref 70–99)
HCT VFR BLD AUTO: 40.9 % (ref 40.5–52.5)
HDLC SERPL-MCNC: 40 MG/DL (ref 40–60)
HGB BLD-MCNC: 13.9 G/DL (ref 13.5–17.5)
IMMATURE RETIC FRACT: 0.4 (ref 0.21–0.37)
IRON SATN MFR SERPL: 37 % (ref 20–50)
IRON SERPL-MCNC: 101 UG/DL (ref 59–158)
LDLC SERPL CALC-MCNC: 84 MG/DL
LYMPHOCYTES # BLD: 1.5 K/UL (ref 1–5.1)
LYMPHOCYTES NFR BLD: 24.3 %
MCH RBC QN AUTO: 31.2 PG (ref 26–34)
MCHC RBC AUTO-ENTMCNC: 33.9 G/DL (ref 31–36)
MCV RBC AUTO: 91.9 FL (ref 80–100)
MONOCYTES # BLD: 0.6 K/UL (ref 0–1.3)
MONOCYTES NFR BLD: 10.1 %
NEUTROPHILS # BLD: 4 K/UL (ref 1.7–7.7)
NEUTROPHILS NFR BLD: 63.1 %
PLATELET # BLD AUTO: 267 K/UL (ref 135–450)
PMV BLD AUTO: 8.4 FL (ref 5–10.5)
POTASSIUM SERPL-SCNC: 4.5 MMOL/L (ref 3.5–5.1)
PROT SERPL-MCNC: 7.8 G/DL (ref 6.4–8.2)
RBC # BLD AUTO: 4.46 M/UL (ref 4.2–5.9)
RETICS # AUTO: 0.04 M/UL
RETICS/RBC NFR AUTO: 0.93 % (ref 0.5–2.18)
SODIUM SERPL-SCNC: 143 MMOL/L (ref 136–145)
TIBC SERPL-MCNC: 271 UG/DL (ref 260–445)
TRIGL SERPL-MCNC: 120 MG/DL (ref 0–150)
TSH SERPL DL<=0.005 MIU/L-ACNC: 2.38 UIU/ML (ref 0.27–4.2)
VIT B12 SERPL-MCNC: 1235 PG/ML (ref 211–911)
VLDLC SERPL CALC-MCNC: 24 MG/DL
WBC # BLD AUTO: 6.3 K/UL (ref 4–11)

## 2023-04-11 LAB
EST. AVERAGE GLUCOSE BLD GHB EST-MCNC: 108.3 MG/DL
HBA1C MFR BLD: 5.4 %

## 2023-04-12 LAB — COPPER SERPL-MCNC: 101.8 UG/DL (ref 70–140)

## 2023-04-15 ENCOUNTER — PATIENT MESSAGE (OUTPATIENT)
Dept: INTERNAL MEDICINE CLINIC | Age: 73
End: 2023-04-15

## 2023-04-15 DIAGNOSIS — C61 ADENOCARCINOMA OF PROSTATE (HCC): Primary | ICD-10-CM

## 2023-04-17 NOTE — TELEPHONE ENCOUNTER
From: Colin Reynolds  To: Dr. Douglas Sos: 4/15/2023 9:16 AM EDT  Subject: PSA testing    In reviewing my lab results, I dont see a PSA lab value. Can we include this test when I get my labs done next week?   Thanks

## 2023-04-20 DIAGNOSIS — N18.30 STAGE 3 CHRONIC KIDNEY DISEASE, UNSPECIFIED WHETHER STAGE 3A OR 3B CKD (HCC): ICD-10-CM

## 2023-04-20 DIAGNOSIS — C61 ADENOCARCINOMA OF PROSTATE (HCC): ICD-10-CM

## 2023-04-20 LAB
ANION GAP SERPL CALCULATED.3IONS-SCNC: 11 MMOL/L (ref 3–16)
BUN SERPL-MCNC: 20 MG/DL (ref 7–20)
CALCIUM SERPL-MCNC: 9.4 MG/DL (ref 8.3–10.6)
CHLORIDE SERPL-SCNC: 108 MMOL/L (ref 99–110)
CO2 SERPL-SCNC: 23 MMOL/L (ref 21–32)
CREAT SERPL-MCNC: 1.5 MG/DL (ref 0.8–1.3)
GFR SERPLBLD CREATININE-BSD FMLA CKD-EPI: 48 ML/MIN/{1.73_M2}
GLUCOSE SERPL-MCNC: 109 MG/DL (ref 70–99)
POTASSIUM SERPL-SCNC: 4.4 MMOL/L (ref 3.5–5.1)
PSA SERPL DL<=0.01 NG/ML-MCNC: <0.01 NG/ML (ref 0–4)
SODIUM SERPL-SCNC: 142 MMOL/L (ref 136–145)

## 2023-08-16 RX ORDER — CARVEDILOL 12.5 MG/1
TABLET ORAL
Qty: 180 TABLET | Refills: 1 | Status: SHIPPED | OUTPATIENT
Start: 2023-08-16 | End: 2023-09-29 | Stop reason: SDUPTHER

## 2023-08-16 RX ORDER — POTASSIUM CHLORIDE 1500 MG/1
TABLET, EXTENDED RELEASE ORAL
Qty: 180 TABLET | Refills: 1 | Status: SHIPPED | OUTPATIENT
Start: 2023-08-16 | End: 2023-09-29 | Stop reason: SDUPTHER

## 2023-09-05 ENCOUNTER — PATIENT MESSAGE (OUTPATIENT)
Dept: INTERNAL MEDICINE CLINIC | Age: 73
End: 2023-09-05

## 2023-09-05 DIAGNOSIS — N39.46 MIXED STRESS AND URGE URINARY INCONTINENCE: ICD-10-CM

## 2023-09-05 DIAGNOSIS — I10 ESSENTIAL HYPERTENSION: ICD-10-CM

## 2023-09-05 DIAGNOSIS — I48.0 PAROXYSMAL ATRIAL FIBRILLATION (HCC): ICD-10-CM

## 2023-09-05 DIAGNOSIS — I10 HYPERTENSION GOAL BP (BLOOD PRESSURE) < 130/80: ICD-10-CM

## 2023-09-05 RX ORDER — TOLTERODINE 4 MG/1
CAPSULE, EXTENDED RELEASE ORAL
Qty: 90 CAPSULE | Refills: 1 | Status: SHIPPED | OUTPATIENT
Start: 2023-09-05 | End: 2023-09-06 | Stop reason: SDUPTHER

## 2023-09-05 RX ORDER — DILTIAZEM HYDROCHLORIDE 240 MG/1
240 CAPSULE, EXTENDED RELEASE ORAL DAILY
Qty: 90 CAPSULE | Refills: 1 | Status: SHIPPED | OUTPATIENT
Start: 2023-09-05

## 2023-09-05 RX ORDER — ATORVASTATIN CALCIUM 20 MG/1
20 TABLET, FILM COATED ORAL NIGHTLY
Qty: 90 TABLET | Refills: 1 | Status: SHIPPED | OUTPATIENT
Start: 2023-09-05

## 2023-09-05 RX ORDER — CHLORTHALIDONE 25 MG/1
25 TABLET ORAL DAILY
Qty: 90 TABLET | Refills: 0 | Status: SHIPPED | OUTPATIENT
Start: 2023-09-05 | End: 2023-12-04

## 2023-09-05 NOTE — TELEPHONE ENCOUNTER
From: Merlene Black  To: Dr. Park Lori: 9/5/2023 3:16 PM EDT  Subject: Medication refills    Could I request that you refill all my prescriptions? If done now, it will save me a large amount of money- Toleretadine over $1000.00 for three months, Eliquis $800.00 per month without insurance. Thank you!

## 2023-09-05 NOTE — TELEPHONE ENCOUNTER
From: Anshul Vega  To: Dr. Ida Juarez: 9/5/2023 2:01 PM EDT  Subject: Medication refill, continuing care. I have an appointment to see you on October 30th. I hope this [facility] misery ends soon, as so many of us are caught with the potential of loss of medical care. I am happy with the care I have received from you, and hope to continue. Four of my medications have no more refills left-  Atorvastatin, Chlorthalidone, Diltiazem, and Toleretadine. I am requesting that you renew these medications . I will run out of Chlorthiadone, and Toleretadine before I see you in October. (Please send the Toleretadine Rx to [facility] in [facility]). Thank you for your friendship, and your excellent care!

## 2023-09-06 RX ORDER — TOLTERODINE 4 MG/1
CAPSULE, EXTENDED RELEASE ORAL
Qty: 90 CAPSULE | Refills: 0 | Status: SHIPPED | OUTPATIENT
Start: 2023-09-06 | End: 2023-09-08 | Stop reason: SDUPTHER

## 2023-09-08 DIAGNOSIS — N39.46 MIXED STRESS AND URGE URINARY INCONTINENCE: ICD-10-CM

## 2023-09-08 RX ORDER — TOLTERODINE 4 MG/1
CAPSULE, EXTENDED RELEASE ORAL
Qty: 90 CAPSULE | Refills: 0 | Status: SHIPPED | OUTPATIENT
Start: 2023-09-08

## 2023-09-27 DIAGNOSIS — I10 ESSENTIAL HYPERTENSION: ICD-10-CM

## 2023-09-28 RX ORDER — CHLORTHALIDONE 25 MG/1
25 TABLET ORAL DAILY
Qty: 90 TABLET | Refills: 1 | OUTPATIENT
Start: 2023-09-28

## 2023-09-29 ENCOUNTER — OFFICE VISIT (OUTPATIENT)
Dept: INTERNAL MEDICINE CLINIC | Age: 73
End: 2023-09-29
Payer: MEDICARE

## 2023-09-29 VITALS
BODY MASS INDEX: 30.8 KG/M2 | HEIGHT: 71 IN | HEART RATE: 87 BPM | OXYGEN SATURATION: 99 % | WEIGHT: 220 LBS | DIASTOLIC BLOOD PRESSURE: 80 MMHG | SYSTOLIC BLOOD PRESSURE: 138 MMHG

## 2023-09-29 DIAGNOSIS — E53.8 B12 DEFICIENCY: ICD-10-CM

## 2023-09-29 DIAGNOSIS — I48.0 PAROXYSMAL ATRIAL FIBRILLATION (HCC): ICD-10-CM

## 2023-09-29 DIAGNOSIS — N18.30 STAGE 3 CHRONIC KIDNEY DISEASE, UNSPECIFIED WHETHER STAGE 3A OR 3B CKD (HCC): ICD-10-CM

## 2023-09-29 DIAGNOSIS — I10 ESSENTIAL HYPERTENSION: ICD-10-CM

## 2023-09-29 DIAGNOSIS — Z23 NEEDS FLU SHOT: ICD-10-CM

## 2023-09-29 DIAGNOSIS — E78.2 MIXED HYPERLIPIDEMIA: Primary | ICD-10-CM

## 2023-09-29 DIAGNOSIS — E78.2 MIXED HYPERLIPIDEMIA: ICD-10-CM

## 2023-09-29 DIAGNOSIS — I10 HYPERTENSION GOAL BP (BLOOD PRESSURE) < 130/80: ICD-10-CM

## 2023-09-29 DIAGNOSIS — N39.46 MIXED STRESS AND URGE URINARY INCONTINENCE: ICD-10-CM

## 2023-09-29 LAB
ALBUMIN SERPL-MCNC: 4.6 G/DL (ref 3.4–5)
ALBUMIN/GLOB SERPL: 1.8 {RATIO} (ref 1.1–2.2)
ALP SERPL-CCNC: 111 U/L (ref 40–129)
ALT SERPL-CCNC: 15 U/L (ref 10–40)
ANION GAP SERPL CALCULATED.3IONS-SCNC: 15 MMOL/L (ref 3–16)
AST SERPL-CCNC: 15 U/L (ref 15–37)
BASOPHILS # BLD: 0 K/UL (ref 0–0.2)
BASOPHILS NFR BLD: 0.7 %
BILIRUB SERPL-MCNC: 0.6 MG/DL (ref 0–1)
BUN SERPL-MCNC: 28 MG/DL (ref 7–20)
CALCIUM SERPL-MCNC: 9 MG/DL (ref 8.3–10.6)
CHLORIDE SERPL-SCNC: 107 MMOL/L (ref 99–110)
CHOLEST SERPL-MCNC: 142 MG/DL (ref 0–199)
CO2 SERPL-SCNC: 20 MMOL/L (ref 21–32)
CREAT SERPL-MCNC: 1.6 MG/DL (ref 0.8–1.3)
DEPRECATED RDW RBC AUTO: 14.1 % (ref 12.4–15.4)
EOSINOPHIL # BLD: 0.2 K/UL (ref 0–0.6)
EOSINOPHIL NFR BLD: 3.2 %
GFR SERPLBLD CREATININE-BSD FMLA CKD-EPI: 45 ML/MIN/{1.73_M2}
GLUCOSE SERPL-MCNC: 97 MG/DL (ref 70–99)
HCT VFR BLD AUTO: 38.8 % (ref 40.5–52.5)
HDLC SERPL-MCNC: 39 MG/DL (ref 40–60)
HGB BLD-MCNC: 13.2 G/DL (ref 13.5–17.5)
LDLC SERPL CALC-MCNC: 86 MG/DL
LYMPHOCYTES # BLD: 1.4 K/UL (ref 1–5.1)
LYMPHOCYTES NFR BLD: 19.2 %
MCH RBC QN AUTO: 31.9 PG (ref 26–34)
MCHC RBC AUTO-ENTMCNC: 34 G/DL (ref 31–36)
MCV RBC AUTO: 93.9 FL (ref 80–100)
MONOCYTES # BLD: 0.9 K/UL (ref 0–1.3)
MONOCYTES NFR BLD: 11.4 %
NEUTROPHILS # BLD: 4.9 K/UL (ref 1.7–7.7)
NEUTROPHILS NFR BLD: 65.5 %
PLATELET # BLD AUTO: 294 K/UL (ref 135–450)
PMV BLD AUTO: 8.4 FL (ref 5–10.5)
POTASSIUM SERPL-SCNC: 4.6 MMOL/L (ref 3.5–5.1)
PROT SERPL-MCNC: 7.1 G/DL (ref 6.4–8.2)
RBC # BLD AUTO: 4.13 M/UL (ref 4.2–5.9)
SODIUM SERPL-SCNC: 142 MMOL/L (ref 136–145)
TRIGL SERPL-MCNC: 87 MG/DL (ref 0–150)
TSH SERPL DL<=0.005 MIU/L-ACNC: 2.49 UIU/ML (ref 0.27–4.2)
VLDLC SERPL CALC-MCNC: 17 MG/DL
WBC # BLD AUTO: 7.5 K/UL (ref 4–11)

## 2023-09-29 PROCEDURE — 3075F SYST BP GE 130 - 139MM HG: CPT | Performed by: INTERNAL MEDICINE

## 2023-09-29 PROCEDURE — 1123F ACP DISCUSS/DSCN MKR DOCD: CPT | Performed by: INTERNAL MEDICINE

## 2023-09-29 PROCEDURE — 99214 OFFICE O/P EST MOD 30 MIN: CPT | Performed by: INTERNAL MEDICINE

## 2023-09-29 PROCEDURE — 3079F DIAST BP 80-89 MM HG: CPT | Performed by: INTERNAL MEDICINE

## 2023-09-29 RX ORDER — DILTIAZEM HYDROCHLORIDE 240 MG/1
240 CAPSULE, EXTENDED RELEASE ORAL DAILY
Qty: 90 CAPSULE | Refills: 1 | Status: SHIPPED | OUTPATIENT
Start: 2023-09-29

## 2023-09-29 RX ORDER — ATORVASTATIN CALCIUM 20 MG/1
20 TABLET, FILM COATED ORAL NIGHTLY
Qty: 90 TABLET | Refills: 1 | Status: SHIPPED | OUTPATIENT
Start: 2023-09-29

## 2023-09-29 RX ORDER — CHLORTHALIDONE 25 MG/1
25 TABLET ORAL DAILY
Qty: 90 TABLET | Refills: 1 | Status: SHIPPED | OUTPATIENT
Start: 2023-09-29 | End: 2024-03-27

## 2023-09-29 RX ORDER — POTASSIUM CHLORIDE 20 MEQ/1
20 TABLET, EXTENDED RELEASE ORAL 2 TIMES DAILY
Qty: 180 TABLET | Refills: 1 | Status: SHIPPED | OUTPATIENT
Start: 2023-09-29

## 2023-09-29 RX ORDER — CARVEDILOL 12.5 MG/1
12.5 TABLET ORAL 2 TIMES DAILY WITH MEALS
Qty: 180 TABLET | Refills: 1 | Status: SHIPPED | OUTPATIENT
Start: 2023-09-29

## 2023-09-29 RX ORDER — LOSARTAN POTASSIUM 50 MG/1
50 TABLET ORAL 2 TIMES DAILY
Qty: 180 TABLET | Refills: 3 | Status: SHIPPED | OUTPATIENT
Start: 2023-09-29

## 2023-09-29 ASSESSMENT — ENCOUNTER SYMPTOMS
SHORTNESS OF BREATH: 0
CHEST TIGHTNESS: 0
CONSTIPATION: 0
ABDOMINAL PAIN: 0
COUGH: 0
WHEEZING: 0
COLOR CHANGE: 0
SINUS PAIN: 0
BLOOD IN STOOL: 0

## 2023-09-29 NOTE — PROGRESS NOTES
Ruchi Fields (:  1950) is a 75 y.o. male,Established patient, here for evaluation of the following chief complaint(s):  6 Month Follow-Up         ASSESSMENT/PLAN:  1. Mixed hyperlipidemia  -     Comprehensive Metabolic Panel; Future  -     Lipid Panel; Future  2. Essential hypertension  -     CBC with Auto Differential; Future  -     Comprehensive Metabolic Panel; Future  -     TSH; Future  -     chlorthalidone (HYGROTON) 25 MG tablet; Take 1 tablet by mouth daily, Disp-90 tablet, R-1Normal  3. Stage 3 chronic kidney disease, unspecified whether stage 3a or 3b CKD ([address])  4. B12 deficiency  5. Needs flu shot  -     Influenza, FLUAD, (age 72 y+), IM, PF, 0.5 mL  6. Paroxysmal atrial fibrillation (HCC)  -     apixaban (ELIQUIS) 5 MG TABS tablet; TAKE 1 TABLET TWICE A DAY, Disp-180 tablet, R-1Normal  7. Mixed stress and urge urinary incontinence  -     atorvastatin (LIPITOR) 20 MG tablet; Take 1 tablet by mouth nightly, Disp-90 tablet, R-1Normal  8. Hypertension goal BP (blood pressure) < 130/80  -     losartan (COZAAR) 50 MG tablet; Take 1 tablet by mouth 2 times daily, Disp-180 tablet, R-3Normal  -     dilTIAZem (TIAZAC) 240 MG extended release capsule; Take 1 capsule by mouth daily, Disp-90 capsule, R-1Normal    Patient blood pressure little presentation was elevated after resting it did improve at home he has really good readings    We can check the patient kidney function today and see if any changes have occurred    The patient is taking his cholesterol medication is doing fairly well we will check his lipid profile and liver function today  Return in about 6 months (around 3/29/2024).          Subjective   SUBJECTIVE/OBJECTIVE:    Lab Review   Lab Results   Component Value Date/Time     2023 09:34 AM     04/10/2023 10:20 AM     12/15/2022 07:14 AM    K 4.4 2023 09:34 AM    K 4.5 04/10/2023 10:20 AM    K 4.2 12/15/2022 07:14 AM    CO2 23 2023 09:34 AM    CO2 21

## 2023-10-02 NOTE — RESULT ENCOUNTER NOTE
Please let the patient know that results were acceptable with the exception of the kidney function which has been gradually going up please advise patient to hydrate well and repeat his BMP in 1 week an order has been placed in the chart

## 2023-11-09 NOTE — PROGRESS NOTES
[facility]   Cardiac Evaluation      Patient: Pritesh Figueroa  YOB: 1950         No chief complaint on file. Referring provider: Emelia Julio MD    History of Present Illness:   Pritesh Figueroa is a 75 y.o. male here for one year follow up. Established care with me in 2021 after moving here from [place]. 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 With regard to medication therapy he/she has been compliant with prescribed regimen and has tolerated therapy to date. Past Medical History:   has a past medical history of Permanent atrial fibrillation ([address]), Postural urinary incontinence, Primary hypertension, and Prostate cancer ([address]). Surgical History:   has a past surgical history that includes CT CRYOABLATION OF RENAL TUMOR (12/15/2022).      Current Outpatient Medications   Medication Sig Dispense Refill    apixaban (ELIQUIS) 5 MG TABS tablet TAKE 1 TABLET TWICE A  tablet 1    atorvastatin (LIPITOR) 20 MG tablet Take 1 tablet by mouth nightly 90 tablet 1    losartan (COZAAR) 50 MG tablet Take 1 tablet by mouth 2 times daily 180 tablet 3    carvedilol (COREG) 12.5 MG tablet Take 1 tablet by mouth with breakfast and with evening meal 180 tablet 1    dilTIAZem (TIAZAC) 240 MG extended release capsule Take 1 capsule by mouth daily 90 capsule 1    chlorthalidone (HYGROTON) 25 MG tablet Take 1 tablet by mouth daily 90 tablet 1    potassium chloride (KLOR-CON M20) 20 MEQ extended release tablet Take 1 tablet by mouth 2 times daily 180 tablet 1    tolterodine (DETROL LA) 4 MG extended release capsule TAKE 1 CAPSULE DAILY 90 capsule 0    vitamin D 25 MCG (1000 UT) CAPS Take 1 capsule by mouth daily      coenzyme Q10 200 MG CAPS capsule Take 1 capsule by mouth daily      cyanocobalamin 500 MCG tablet Take 2 tablets by mouth daily      acetaminophen (TYLENOL) 500 MG tablet Take 2 tablets by mouth 2 times daily       No current
and complete to my knowledge. I agree with the details independently gathered by the clinical support staff, while the remaining scribed note accurately describes my personal service to the patient.

## 2023-11-10 ENCOUNTER — OFFICE VISIT (OUTPATIENT)
Dept: CARDIOLOGY CLINIC | Age: 73
End: 2023-11-10
Payer: MEDICARE

## 2023-11-10 ENCOUNTER — TELEPHONE (OUTPATIENT)
Dept: CARDIOLOGY CLINIC | Age: 73
End: 2023-11-10

## 2023-11-10 VITALS
BODY MASS INDEX: 30.52 KG/M2 | HEART RATE: 73 BPM | DIASTOLIC BLOOD PRESSURE: 64 MMHG | SYSTOLIC BLOOD PRESSURE: 136 MMHG | HEIGHT: 71 IN | WEIGHT: 218 LBS | OXYGEN SATURATION: 97 %

## 2023-11-10 DIAGNOSIS — I48.0 PAROXYSMAL ATRIAL FIBRILLATION (HCC): Primary | ICD-10-CM

## 2023-11-10 PROCEDURE — 3078F DIAST BP <80 MM HG: CPT | Performed by: INTERNAL MEDICINE

## 2023-11-10 PROCEDURE — 99214 OFFICE O/P EST MOD 30 MIN: CPT | Performed by: INTERNAL MEDICINE

## 2023-11-10 PROCEDURE — 1123F ACP DISCUSS/DSCN MKR DOCD: CPT | Performed by: INTERNAL MEDICINE

## 2023-11-10 PROCEDURE — 3075F SYST BP GE 130 - 139MM HG: CPT | Performed by: INTERNAL MEDICINE

## 2023-11-10 NOTE — TELEPHONE ENCOUNTER
Patient was referred by [facility] [place] to the [facility] location with Dr. Kourtney Philip. Patient has been scheduled for 1/11/24 at 2:00pm. Patient verbalized understanding of appointment instructions. Call complete.

## 2024-01-04 DIAGNOSIS — N28.89 RENAL MASS: Primary | ICD-10-CM

## 2024-01-17 ENCOUNTER — HOSPITAL ENCOUNTER (OUTPATIENT)
Dept: MRI IMAGING | Age: 74
Discharge: HOME | End: 2024-01-17
Payer: MEDICARE

## 2024-01-17 DIAGNOSIS — N28.89 RENAL MASS: ICD-10-CM

## 2024-01-17 PROCEDURE — 2580000003 HC RX 258: Performed by: UROLOGY

## 2024-01-17 PROCEDURE — A9579 GAD-BASE MR CONTRAST NOS,1ML: HCPCS | Performed by: UROLOGY

## 2024-01-17 PROCEDURE — 74183 MRI ABD W/O CNTR FLWD CNTR: CPT

## 2024-01-17 PROCEDURE — 6360000004 HC RX CONTRAST MEDICATION: Performed by: UROLOGY

## 2024-01-17 RX ORDER — SODIUM CHLORIDE 0.9 % (FLUSH) 0.9 %
10 SYRINGE (ML) INJECTION PRN
Status: DISCONTINUED | OUTPATIENT
Start: 2024-01-17 | End: 2024-01-18 | Stop reason: HOSPADM

## 2024-01-17 RX ORDER — 0.9 % SODIUM CHLORIDE 0.9 %
50 INTRAVENOUS SOLUTION INTRAVENOUS ONCE
Status: COMPLETED | OUTPATIENT
Start: 2024-01-17 | End: 2024-01-17

## 2024-01-17 RX ADMIN — GADOTERIDOL 20 ML: 279.3 INJECTION, SOLUTION INTRAVENOUS at 10:31

## 2024-01-17 RX ADMIN — SODIUM CHLORIDE 50 ML: 9 INJECTION, SOLUTION INTRAVENOUS at 10:30

## 2024-01-17 RX ADMIN — SODIUM CHLORIDE, PRESERVATIVE FREE 10 ML: 5 INJECTION INTRAVENOUS at 09:45

## 2024-01-31 ENCOUNTER — PATIENT MESSAGE (OUTPATIENT)
Dept: INTERNAL MEDICINE CLINIC | Age: 74
End: 2024-01-31

## 2024-01-31 DIAGNOSIS — I10 HYPERTENSION GOAL BP (BLOOD PRESSURE) < 130/80: ICD-10-CM

## 2024-01-31 DIAGNOSIS — I10 ESSENTIAL HYPERTENSION: ICD-10-CM

## 2024-01-31 DIAGNOSIS — N39.46 MIXED STRESS AND URGE URINARY INCONTINENCE: ICD-10-CM

## 2024-02-01 RX ORDER — CHLORTHALIDONE 25 MG/1
25 TABLET ORAL DAILY
Qty: 90 TABLET | Refills: 0 | Status: SHIPPED | OUTPATIENT
Start: 2024-02-01 | End: 2024-07-30

## 2024-02-01 RX ORDER — ATORVASTATIN CALCIUM 20 MG/1
20 TABLET, FILM COATED ORAL NIGHTLY
Qty: 90 TABLET | Refills: 0 | Status: SHIPPED | OUTPATIENT
Start: 2024-02-01

## 2024-02-01 RX ORDER — DILTIAZEM HYDROCHLORIDE 240 MG/1
240 CAPSULE, EXTENDED RELEASE ORAL DAILY
Qty: 90 CAPSULE | Refills: 0 | Status: SHIPPED | OUTPATIENT
Start: 2024-02-01

## 2024-02-16 DIAGNOSIS — N39.46 MIXED STRESS AND URGE URINARY INCONTINENCE: ICD-10-CM

## 2024-02-16 DIAGNOSIS — I10 ESSENTIAL HYPERTENSION: ICD-10-CM

## 2024-02-16 RX ORDER — CARVEDILOL 12.5 MG/1
12.5 TABLET ORAL 2 TIMES DAILY WITH MEALS
Qty: 180 TABLET | Refills: 0 | OUTPATIENT
Start: 2024-02-16

## 2024-02-16 RX ORDER — POTASSIUM CHLORIDE 20 MEQ/1
20 TABLET, EXTENDED RELEASE ORAL 2 TIMES DAILY
Qty: 180 TABLET | Refills: 0 | Status: SHIPPED | OUTPATIENT
Start: 2024-02-16

## 2024-02-16 RX ORDER — ATORVASTATIN CALCIUM 20 MG/1
20 TABLET, FILM COATED ORAL NIGHTLY
Qty: 90 TABLET | Refills: 0 | Status: CANCELLED | OUTPATIENT
Start: 2024-02-16

## 2024-02-16 RX ORDER — CHLORTHALIDONE 25 MG/1
25 TABLET ORAL DAILY
Qty: 90 TABLET | Refills: 0 | Status: CANCELLED | OUTPATIENT
Start: 2024-02-16 | End: 2024-08-14

## 2024-02-28 DIAGNOSIS — I48.0 PAROXYSMAL ATRIAL FIBRILLATION (HCC): ICD-10-CM

## 2024-02-28 RX ORDER — CARVEDILOL 12.5 MG/1
12.5 TABLET ORAL 2 TIMES DAILY WITH MEALS
Qty: 90 TABLET | Refills: 0 | Status: SHIPPED | OUTPATIENT
Start: 2024-02-28

## 2024-02-28 NOTE — TELEPHONE ENCOUNTER
Last OV: 9/29/2023  Next OV: 4/1/24    Last fill:9/29/23   Refills:1    Patient needs medication to be sent locally.

## 2024-04-01 ENCOUNTER — OFFICE VISIT (OUTPATIENT)
Dept: INTERNAL MEDICINE CLINIC | Age: 74
End: 2024-04-01
Payer: MEDICARE

## 2024-04-01 VITALS
DIASTOLIC BLOOD PRESSURE: 82 MMHG | HEART RATE: 82 BPM | BODY MASS INDEX: 30.56 KG/M2 | SYSTOLIC BLOOD PRESSURE: 138 MMHG | OXYGEN SATURATION: 99 % | WEIGHT: 219 LBS

## 2024-04-01 VITALS — HEART RATE: 75 BPM | OXYGEN SATURATION: 93 %

## 2024-04-01 DIAGNOSIS — Z00.00 MEDICARE ANNUAL WELLNESS VISIT, SUBSEQUENT: Primary | ICD-10-CM

## 2024-04-01 DIAGNOSIS — E78.2 MIXED HYPERLIPIDEMIA: ICD-10-CM

## 2024-04-01 DIAGNOSIS — I10 HYPERTENSION GOAL BP (BLOOD PRESSURE) < 130/80: ICD-10-CM

## 2024-04-01 DIAGNOSIS — D50.8 IRON DEFICIENCY ANEMIA SECONDARY TO INADEQUATE DIETARY IRON INTAKE: ICD-10-CM

## 2024-04-01 DIAGNOSIS — E53.8 B12 DEFICIENCY: ICD-10-CM

## 2024-04-01 DIAGNOSIS — I10 ESSENTIAL HYPERTENSION: Primary | ICD-10-CM

## 2024-04-01 DIAGNOSIS — Z12.11 COLON CANCER SCREENING: ICD-10-CM

## 2024-04-01 DIAGNOSIS — N39.46 MIXED STRESS AND URGE URINARY INCONTINENCE: ICD-10-CM

## 2024-04-01 DIAGNOSIS — I48.0 PAROXYSMAL ATRIAL FIBRILLATION (HCC): ICD-10-CM

## 2024-04-01 DIAGNOSIS — C61 PROSTATE CANCER (HCC): ICD-10-CM

## 2024-04-01 DIAGNOSIS — N18.31 STAGE 3A CHRONIC KIDNEY DISEASE (HCC): ICD-10-CM

## 2024-04-01 DIAGNOSIS — I10 ESSENTIAL HYPERTENSION: ICD-10-CM

## 2024-04-01 PROBLEM — Z91.81 PERSONAL HISTORY OF FALL: Status: RESOLVED | Noted: 2023-03-30 | Resolved: 2024-04-01

## 2024-04-01 PROBLEM — N39.492 POSTURAL URINARY INCONTINENCE: Status: RESOLVED | Noted: 2021-10-06 | Resolved: 2024-04-01

## 2024-04-01 PROBLEM — I48.21 PERMANENT ATRIAL FIBRILLATION (HCC): Status: RESOLVED | Noted: 2021-10-06 | Resolved: 2024-04-01

## 2024-04-01 LAB
ALBUMIN SERPL-MCNC: 4.5 G/DL (ref 3.4–5)
ALBUMIN/GLOB SERPL: 1.7 {RATIO} (ref 1.1–2.2)
ALP SERPL-CCNC: 95 U/L (ref 40–129)
ALT SERPL-CCNC: 15 U/L (ref 10–40)
ANION GAP SERPL CALCULATED.3IONS-SCNC: 13 MMOL/L (ref 3–16)
AST SERPL-CCNC: 13 U/L (ref 15–37)
BASOPHILS # BLD: 0.1 K/UL (ref 0–0.2)
BASOPHILS NFR BLD: 0.8 %
BILIRUB SERPL-MCNC: 0.7 MG/DL (ref 0–1)
BUN SERPL-MCNC: 37 MG/DL (ref 7–20)
CALCIUM SERPL-MCNC: 9.2 MG/DL (ref 8.3–10.6)
CHLORIDE SERPL-SCNC: 104 MMOL/L (ref 99–110)
CHOLEST SERPL-MCNC: 140 MG/DL (ref 0–199)
CO2 SERPL-SCNC: 22 MMOL/L (ref 21–32)
CREAT SERPL-MCNC: 1.7 MG/DL (ref 0.8–1.3)
DEPRECATED RDW RBC AUTO: 14.3 % (ref 12.4–15.4)
EOSINOPHIL # BLD: 0.2 K/UL (ref 0–0.6)
EOSINOPHIL NFR BLD: 3.3 %
FOLATE SERPL-MCNC: >20 NG/ML (ref 4.78–24.2)
GFR SERPLBLD CREATININE-BSD FMLA CKD-EPI: 41 ML/MIN/{1.73_M2}
GLUCOSE SERPL-MCNC: 94 MG/DL (ref 70–99)
HCT VFR BLD AUTO: 35.2 % (ref 40.5–52.5)
HDLC SERPL-MCNC: 41 MG/DL (ref 40–60)
HGB BLD-MCNC: 12.3 G/DL (ref 13.5–17.5)
LDLC SERPL CALC-MCNC: 79 MG/DL
LYMPHOCYTES # BLD: 1.8 K/UL (ref 1–5.1)
LYMPHOCYTES NFR BLD: 28.6 %
MCH RBC QN AUTO: 32.9 PG (ref 26–34)
MCHC RBC AUTO-ENTMCNC: 35 G/DL (ref 31–36)
MCV RBC AUTO: 94 FL (ref 80–100)
MONOCYTES # BLD: 0.7 K/UL (ref 0–1.3)
MONOCYTES NFR BLD: 11.2 %
NEUTROPHILS # BLD: 3.5 K/UL (ref 1.7–7.7)
NEUTROPHILS NFR BLD: 56.1 %
PLATELET # BLD AUTO: 242 K/UL (ref 135–450)
PMV BLD AUTO: 8.4 FL (ref 5–10.5)
POTASSIUM SERPL-SCNC: 4.5 MMOL/L (ref 3.5–5.1)
PROT SERPL-MCNC: 7.2 G/DL (ref 6.4–8.2)
PSA SERPL DL<=0.01 NG/ML-MCNC: <0.01 NG/ML (ref 0–4)
RBC # BLD AUTO: 3.74 M/UL (ref 4.2–5.9)
SODIUM SERPL-SCNC: 139 MMOL/L (ref 136–145)
TRIGL SERPL-MCNC: 102 MG/DL (ref 0–150)
TSH SERPL DL<=0.005 MIU/L-ACNC: 2.02 UIU/ML (ref 0.27–4.2)
VIT B12 SERPL-MCNC: 949 PG/ML (ref 211–911)
VLDLC SERPL CALC-MCNC: 20 MG/DL
WBC # BLD AUTO: 6.2 K/UL (ref 4–11)

## 2024-04-01 PROCEDURE — 1123F ACP DISCUSS/DSCN MKR DOCD: CPT | Performed by: INTERNAL MEDICINE

## 2024-04-01 PROCEDURE — 3075F SYST BP GE 130 - 139MM HG: CPT | Performed by: INTERNAL MEDICINE

## 2024-04-01 PROCEDURE — G0439 PPPS, SUBSEQ VISIT: HCPCS | Performed by: INTERNAL MEDICINE

## 2024-04-01 PROCEDURE — 99214 OFFICE O/P EST MOD 30 MIN: CPT | Performed by: INTERNAL MEDICINE

## 2024-04-01 PROCEDURE — 3079F DIAST BP 80-89 MM HG: CPT | Performed by: INTERNAL MEDICINE

## 2024-04-01 RX ORDER — TOLTERODINE 4 MG/1
CAPSULE, EXTENDED RELEASE ORAL
Qty: 90 CAPSULE | Refills: 3 | Status: SHIPPED | OUTPATIENT
Start: 2024-04-01

## 2024-04-01 RX ORDER — ATORVASTATIN CALCIUM 20 MG/1
20 TABLET, FILM COATED ORAL NIGHTLY
Qty: 90 TABLET | Refills: 3 | Status: SHIPPED | OUTPATIENT
Start: 2024-04-01

## 2024-04-01 RX ORDER — CARVEDILOL 12.5 MG/1
12.5 TABLET ORAL 2 TIMES DAILY WITH MEALS
Qty: 90 TABLET | Refills: 3 | Status: SHIPPED | OUTPATIENT
Start: 2024-04-01

## 2024-04-01 RX ORDER — CHLORTHALIDONE 25 MG/1
25 TABLET ORAL DAILY
Qty: 90 TABLET | Refills: 3 | Status: SHIPPED | OUTPATIENT
Start: 2024-04-01 | End: 2025-03-27

## 2024-04-01 RX ORDER — DILTIAZEM HYDROCHLORIDE 240 MG/1
240 CAPSULE, EXTENDED RELEASE ORAL DAILY
Qty: 90 CAPSULE | Refills: 3 | Status: SHIPPED | OUTPATIENT
Start: 2024-04-01

## 2024-04-01 RX ORDER — POTASSIUM CHLORIDE 20 MEQ/1
20 TABLET, EXTENDED RELEASE ORAL 2 TIMES DAILY
Qty: 180 TABLET | Refills: 3 | Status: SHIPPED | OUTPATIENT
Start: 2024-04-01

## 2024-04-01 ASSESSMENT — ENCOUNTER SYMPTOMS
BLOOD IN STOOL: 0
ABDOMINAL PAIN: 0
CHEST TIGHTNESS: 0
SHORTNESS OF BREATH: 0
CONSTIPATION: 0
COUGH: 0
WHEEZING: 0
COLOR CHANGE: 0

## 2024-04-01 ASSESSMENT — LIFESTYLE VARIABLES
HOW OFTEN DO YOU HAVE A DRINK CONTAINING ALCOHOL: NEVER
HOW MANY STANDARD DRINKS CONTAINING ALCOHOL DO YOU HAVE ON A TYPICAL DAY: PATIENT DOES NOT DRINK

## 2024-04-01 ASSESSMENT — PATIENT HEALTH QUESTIONNAIRE - PHQ9
SUM OF ALL RESPONSES TO PHQ QUESTIONS 1-9: 0
SUM OF ALL RESPONSES TO PHQ9 QUESTIONS 1 & 2: 0
SUM OF ALL RESPONSES TO PHQ QUESTIONS 1-9: 0
SUM OF ALL RESPONSES TO PHQ QUESTIONS 1-9: 0
1. LITTLE INTEREST OR PLEASURE IN DOING THINGS: NOT AT ALL
2. FEELING DOWN, DEPRESSED OR HOPELESS: NOT AT ALL
SUM OF ALL RESPONSES TO PHQ QUESTIONS 1-9: 0

## 2024-04-01 NOTE — PROGRESS NOTES
Medicare Annual Wellness Visit    Randy Sanchez is here for Medicare AWV    Assessment & Plan   Medicare annual wellness visit, subsequent  Recommendations for Preventive Services Due: see orders and patient instructions/AVS.  Recommended screening schedule for the next 5-10 years is provided to the patient in written form: see Patient Instructions/AVS.     Return in about 1 year (around 4/1/2025) for AWV with RN or PCP.     Subjective   Reviewed healthcare gaps with patient. Patient declines majority of vaccines but is agreeable to complete Tdap when needed.     Patient's complete Health Risk Assessment and screening values have been reviewed and are found in Flowsheets. The following problems were reviewed today and where indicated follow up appointments were made and/or referrals ordered.    Positive Risk Factor Screenings with Interventions:    Fall Risk:  Do you feel unsteady or are you worried about falling? : no  2 or more falls in past year?: (!) yes (fall 1 week ago chair blew away)  Fall with injury in past year?: no     Interventions:    Reviewed medications, home hazards, visual acuity, and co-morbidities that can increase risk for falls  Patient declines any further evaluation or treatment  Encouraged patient to consider in office Physical Therapy Evaluation for worsening instability issues.             Activity, Diet, and Weight:  On average, how many days per week do you engage in moderate to strenuous exercise (like a brisk walk)?: 3 days  On average, how many minutes do you engage in exercise at this level?: 30 min    Do you eat balanced/healthy meals regularly?: Yes    There is no height or weight on file to calculate BMI. (!) Abnormal    Obesity Interventions:  See AVS for additional education material                     Objective   Vitals:    04/01/24 1016   Pulse: 75   SpO2: 93%      There is no height or weight on file to calculate BMI.        Wt Readings from Last 3 Encounters:   04/01/24

## 2024-04-02 DIAGNOSIS — N18.31 STAGE 3A CHRONIC KIDNEY DISEASE (HCC): Primary | ICD-10-CM

## 2024-04-02 NOTE — RESULT ENCOUNTER NOTE
Please let the patient know that results shows his kidney function is not improving at this stage and I think we need to stop the losartan and the chlorthalidone and repeat his kidney function in 48 hours if his kidney function is not better at that point I would like him to see nephrology

## 2024-04-12 DIAGNOSIS — N18.31 STAGE 3A CHRONIC KIDNEY DISEASE (HCC): ICD-10-CM

## 2024-04-12 LAB
ANION GAP SERPL CALCULATED.3IONS-SCNC: 13 MMOL/L (ref 3–16)
BUN SERPL-MCNC: 21 MG/DL (ref 7–20)
CALCIUM SERPL-MCNC: 9.1 MG/DL (ref 8.3–10.6)
CHLORIDE SERPL-SCNC: 108 MMOL/L (ref 99–110)
CO2 SERPL-SCNC: 21 MMOL/L (ref 21–32)
CREAT SERPL-MCNC: 1.4 MG/DL (ref 0.8–1.3)
GFR SERPLBLD CREATININE-BSD FMLA CKD-EPI: 52 ML/MIN/{1.73_M2}
GLUCOSE SERPL-MCNC: 95 MG/DL (ref 70–99)
POTASSIUM SERPL-SCNC: 4.7 MMOL/L (ref 3.5–5.1)
SODIUM SERPL-SCNC: 142 MMOL/L (ref 136–145)

## 2024-08-15 ENCOUNTER — TELEPHONE (OUTPATIENT)
Dept: INTERNAL MEDICINE CLINIC | Age: 74
End: 2024-08-15

## 2024-08-15 NOTE — TELEPHONE ENCOUNTER
Care Transitions Initial Follow Up Call    Outreach made within 2 business days of discharge: Yes    Patient: Randy Sanchez Patient : 1950   MRN: [id number]  Reason for Admission:      Discharge Date: 22       Spoke with: LVM       Discharge department/facility: [facility] Interactive Patient Contact:  Was patient able to fill all prescriptions: LVM requesting to call with difficulty.    Was patient instructed to bring all medications to the follow-up visit: LVM requesting    Is patient taking all medications as directed in the discharge summary? LVM requesting    Does patient understand their discharge instructions: LVM to clarify    Does patient have questions or concerns that need addressed prior to 7-14 day follow up office visit: yes -      Additional needs identified to be addressed with provider  Lvm to call for any questions.             Scheduled appointment with PCP within 7-14 days    Follow Up  Future Appointments   Date Time Provider Department Center   2024  2:00 PM Colby Stevens MD [facility]   2024 10:00 AM Colby Stevens MD [facility]   2024 10:30 AM Amadou Cantu MD [facility]       Zabrina Romero LPN

## 2024-08-23 ENCOUNTER — PATIENT MESSAGE (OUTPATIENT)
Dept: INTERNAL MEDICINE CLINIC | Age: 74
End: 2024-08-23

## 2024-08-23 ENCOUNTER — OFFICE VISIT (OUTPATIENT)
Dept: INTERNAL MEDICINE CLINIC | Age: 74
End: 2024-08-23
Payer: MEDICARE

## 2024-08-23 VITALS
DIASTOLIC BLOOD PRESSURE: 70 MMHG | HEIGHT: 70 IN | SYSTOLIC BLOOD PRESSURE: 122 MMHG | OXYGEN SATURATION: 99 % | HEART RATE: 73 BPM | BODY MASS INDEX: 31.42 KG/M2

## 2024-08-23 DIAGNOSIS — I10 ESSENTIAL HYPERTENSION: ICD-10-CM

## 2024-08-23 DIAGNOSIS — E78.2 MIXED HYPERLIPIDEMIA: ICD-10-CM

## 2024-08-23 DIAGNOSIS — N18.31 STAGE 3A CHRONIC KIDNEY DISEASE (HCC): ICD-10-CM

## 2024-08-23 DIAGNOSIS — D50.8 IRON DEFICIENCY ANEMIA SECONDARY TO INADEQUATE DIETARY IRON INTAKE: ICD-10-CM

## 2024-08-23 DIAGNOSIS — I48.0 PAROXYSMAL ATRIAL FIBRILLATION (HCC): Primary | ICD-10-CM

## 2024-08-23 DIAGNOSIS — N18.30 STAGE 3 CHRONIC KIDNEY DISEASE, UNSPECIFIED WHETHER STAGE 3A OR 3B CKD (HCC): ICD-10-CM

## 2024-08-23 PROCEDURE — G2211 COMPLEX E/M VISIT ADD ON: HCPCS | Performed by: INTERNAL MEDICINE

## 2024-08-23 PROCEDURE — 1123F ACP DISCUSS/DSCN MKR DOCD: CPT | Performed by: INTERNAL MEDICINE

## 2024-08-23 PROCEDURE — 99214 OFFICE O/P EST MOD 30 MIN: CPT | Performed by: INTERNAL MEDICINE

## 2024-08-23 PROCEDURE — 3078F DIAST BP <80 MM HG: CPT | Performed by: INTERNAL MEDICINE

## 2024-08-23 PROCEDURE — 3074F SYST BP LT 130 MM HG: CPT | Performed by: INTERNAL MEDICINE

## 2024-08-23 SDOH — ECONOMIC STABILITY: FOOD INSECURITY: WITHIN THE PAST 12 MONTHS, THE FOOD YOU BOUGHT JUST DIDN'T LAST AND YOU DIDN'T HAVE MONEY TO GET MORE.: NEVER TRUE

## 2024-08-23 SDOH — ECONOMIC STABILITY: INCOME INSECURITY: HOW HARD IS IT FOR YOU TO PAY FOR THE VERY BASICS LIKE FOOD, HOUSING, MEDICAL CARE, AND HEATING?: NOT HARD AT ALL

## 2024-08-23 SDOH — ECONOMIC STABILITY: FOOD INSECURITY: WITHIN THE PAST 12 MONTHS, YOU WORRIED THAT YOUR FOOD WOULD RUN OUT BEFORE YOU GOT MONEY TO BUY MORE.: NEVER TRUE

## 2024-08-23 ASSESSMENT — ENCOUNTER SYMPTOMS
COLOR CHANGE: 0
BLOOD IN STOOL: 0
COUGH: 0
CHEST TIGHTNESS: 0
SHORTNESS OF BREATH: 0
CONSTIPATION: 0
WHEEZING: 0
SINUS PAIN: 0
ABDOMINAL PAIN: 0

## 2024-08-23 NOTE — PROGRESS NOTES
unexpected weight change.   HENT:  Negative for congestion, ear pain and sinus pain.    Respiratory:  Negative for cough, chest tightness, shortness of breath and wheezing.    Cardiovascular:  Negative for chest pain and palpitations.   Gastrointestinal:  Negative for abdominal pain, blood in stool and constipation.   Endocrine: Negative for cold intolerance, heat intolerance and polyuria.   Genitourinary:  Negative for dysuria, frequency and urgency.   Musculoskeletal:  Negative for arthralgias and myalgias.   Skin:  Negative for color change and rash.   Neurological:  Negative for weakness and headaches.   Hematological:  Negative for adenopathy. Does not bruise/bleed easily.   Psychiatric/Behavioral:  Negative for agitation, dysphoric mood and sleep disturbance.           Objective   Physical Exam  Vitals and nursing note reviewed.   Constitutional:       General: He is not in acute distress.     Appearance: Normal appearance.   HENT:      Head: Normocephalic and atraumatic.      Right Ear: Tympanic membrane normal.      Left Ear: Tympanic membrane normal.      Nose: Nose normal.   Eyes:      Extraocular Movements: Extraocular movements intact.      Conjunctiva/sclera: Conjunctivae normal.      Pupils: Pupils are equal, round, and reactive to light.   Neck:      Vascular: No carotid bruit.   Cardiovascular:      Rate and Rhythm: Normal rate and regular rhythm.      Pulses: Normal pulses.      Heart sounds: No murmur heard.  Pulmonary:      Effort: Pulmonary effort is normal. No respiratory distress.      Breath sounds: Normal breath sounds.   Abdominal:      General: Abdomen is flat. Bowel sounds are normal. There is no distension.      Palpations: Abdomen is soft.      Tenderness: There is no abdominal tenderness.   Musculoskeletal:         General: No swelling, tenderness or deformity.      Cervical back: Normal range of motion and neck supple. No rigidity or tenderness.      Right lower leg: No edema.

## 2024-08-26 RX ORDER — METHOCARBAMOL 500 MG/1
500 TABLET, FILM COATED ORAL 3 TIMES DAILY
Qty: 30 TABLET | Refills: 0 | Status: SHIPPED | OUTPATIENT
Start: 2024-08-26

## 2024-08-26 RX ORDER — METHOCARBAMOL 500 MG/1
500 TABLET, FILM COATED ORAL
COMMUNITY
End: 2024-08-26 | Stop reason: SDUPTHER

## 2024-08-28 RX ORDER — CARVEDILOL 12.5 MG/1
TABLET ORAL
Qty: 90 TABLET | Refills: 3 | OUTPATIENT
Start: 2024-08-28

## 2024-08-30 ENCOUNTER — TELEPHONE (OUTPATIENT)
Dept: INTERNAL MEDICINE CLINIC | Age: 74
End: 2024-08-30

## 2024-08-30 DIAGNOSIS — N18.30 STAGE 3 CHRONIC KIDNEY DISEASE, UNSPECIFIED WHETHER STAGE 3A OR 3B CKD (HCC): ICD-10-CM

## 2024-08-30 DIAGNOSIS — I10 ESSENTIAL HYPERTENSION: ICD-10-CM

## 2024-08-30 DIAGNOSIS — R26.9 GAIT DISTURBANCE: ICD-10-CM

## 2024-08-30 DIAGNOSIS — I48.0 PAROXYSMAL ATRIAL FIBRILLATION (HCC): ICD-10-CM

## 2024-08-30 DIAGNOSIS — N18.31 STAGE 3A CHRONIC KIDNEY DISEASE (HCC): ICD-10-CM

## 2024-08-30 DIAGNOSIS — I10 ESSENTIAL HYPERTENSION: Primary | ICD-10-CM

## 2024-08-30 DIAGNOSIS — C61 PROSTATE CANCER (HCC): ICD-10-CM

## 2024-08-31 ENCOUNTER — PATIENT MESSAGE (OUTPATIENT)
Dept: INTERNAL MEDICINE CLINIC | Age: 74
End: 2024-08-31

## 2024-08-31 LAB
ANION GAP SERPL CALCULATED.3IONS-SCNC: 12 MMOL/L (ref 3–16)
BASOPHILS # BLD: 0.1 K/UL (ref 0–0.2)
BASOPHILS NFR BLD: 1.1 %
BUN SERPL-MCNC: 22 MG/DL (ref 7–20)
CALCIUM SERPL-MCNC: 9.1 MG/DL (ref 8.3–10.6)
CHLORIDE SERPL-SCNC: 107 MMOL/L (ref 99–110)
CO2 SERPL-SCNC: 17 MMOL/L (ref 21–32)
CREAT SERPL-MCNC: 1.3 MG/DL (ref 0.8–1.3)
DEPRECATED RDW RBC AUTO: 17.4 % (ref 12.4–15.4)
EOSINOPHIL # BLD: 0.2 K/UL (ref 0–0.6)
EOSINOPHIL NFR BLD: 2.5 %
GFR SERPLBLD CREATININE-BSD FMLA CKD-EPI: 57 ML/MIN/{1.73_M2}
GLUCOSE SERPL-MCNC: 95 MG/DL (ref 70–99)
HCT VFR BLD AUTO: 29.9 % (ref 40.5–52.5)
HGB BLD-MCNC: 10.3 G/DL (ref 13.5–17.5)
LYMPHOCYTES # BLD: 1 K/UL (ref 1–5.1)
LYMPHOCYTES NFR BLD: 16.7 %
MCH RBC QN AUTO: 35.3 PG (ref 26–34)
MCHC RBC AUTO-ENTMCNC: 34.6 G/DL (ref 31–36)
MCV RBC AUTO: 102 FL (ref 80–100)
MONOCYTES # BLD: 0.7 K/UL (ref 0–1.3)
MONOCYTES NFR BLD: 11.9 %
NEUTROPHILS # BLD: 4.2 K/UL (ref 1.7–7.7)
NEUTROPHILS NFR BLD: 67.8 %
PLATELET # BLD AUTO: 334 K/UL (ref 135–450)
PMV BLD AUTO: 7.9 FL (ref 5–10.5)
POTASSIUM SERPL-SCNC: 4.5 MMOL/L (ref 3.5–5.1)
RBC # BLD AUTO: 2.93 M/UL (ref 4.2–5.9)
SODIUM SERPL-SCNC: 136 MMOL/L (ref 136–145)
WBC # BLD AUTO: 6.3 K/UL (ref 4–11)

## 2024-09-03 NOTE — TELEPHONE ENCOUNTER
[facility] calling in. They received a med list for pt today but not the colonoscopy referral. Referral faxed to [phone].

## 2024-09-04 NOTE — RESULT ENCOUNTER NOTE
Please inform that the patient's results are acceptable and we will discuss it further during the visit and will address his anemia at that point

## 2024-09-05 NOTE — TELEPHONE ENCOUNTER
Patient had home health certification and face-to-face evaluation done on 8/23/2024 his home health care plan has been reviewed signed and sent back

## 2024-09-27 ENCOUNTER — TELEPHONE (OUTPATIENT)
Dept: INTERNAL MEDICINE CLINIC | Age: 74
End: 2024-09-27

## 2024-09-27 NOTE — TELEPHONE ENCOUNTER
Clearance request for [facility]; colonoscopy scheduled 12/11/24 afib and htn diagnosis requesting a clearance for patient for procedure.

## 2024-09-30 ENCOUNTER — OFFICE VISIT (OUTPATIENT)
Dept: INTERNAL MEDICINE CLINIC | Age: 74
End: 2024-09-30
Payer: MEDICARE

## 2024-09-30 VITALS
SYSTOLIC BLOOD PRESSURE: 136 MMHG | HEART RATE: 97 BPM | HEIGHT: 70 IN | BODY MASS INDEX: 31.78 KG/M2 | DIASTOLIC BLOOD PRESSURE: 80 MMHG | OXYGEN SATURATION: 97 % | WEIGHT: 222 LBS

## 2024-09-30 DIAGNOSIS — D68.69 SECONDARY HYPERCOAGULABLE STATE (HCC): Primary | ICD-10-CM

## 2024-09-30 DIAGNOSIS — I10 HYPERTENSION GOAL BP (BLOOD PRESSURE) < 130/80: ICD-10-CM

## 2024-09-30 DIAGNOSIS — E53.8 B12 DEFICIENCY: ICD-10-CM

## 2024-09-30 DIAGNOSIS — N39.46 MIXED STRESS AND URGE URINARY INCONTINENCE: ICD-10-CM

## 2024-09-30 DIAGNOSIS — D50.8 IRON DEFICIENCY ANEMIA SECONDARY TO INADEQUATE DIETARY IRON INTAKE: ICD-10-CM

## 2024-09-30 DIAGNOSIS — I48.0 PAROXYSMAL ATRIAL FIBRILLATION (HCC): ICD-10-CM

## 2024-09-30 DIAGNOSIS — E78.2 MIXED HYPERLIPIDEMIA: ICD-10-CM

## 2024-09-30 DIAGNOSIS — C61 PROSTATE CANCER (HCC): ICD-10-CM

## 2024-09-30 LAB
ANION GAP SERPL CALCULATED.3IONS-SCNC: 11 MMOL/L (ref 3–16)
BASOPHILS # BLD: 0.1 K/UL (ref 0–0.2)
BASOPHILS NFR BLD: 0.9 %
BUN SERPL-MCNC: 21 MG/DL (ref 7–20)
CALCIUM SERPL-MCNC: 9.5 MG/DL (ref 8.3–10.6)
CHLORIDE SERPL-SCNC: 108 MMOL/L (ref 99–110)
CHOLEST SERPL-MCNC: 136 MG/DL (ref 0–199)
CO2 SERPL-SCNC: 21 MMOL/L (ref 21–32)
CREAT SERPL-MCNC: 1.2 MG/DL (ref 0.8–1.3)
DEPRECATED RDW RBC AUTO: 14.5 % (ref 12.4–15.4)
EOSINOPHIL # BLD: 0.2 K/UL (ref 0–0.6)
EOSINOPHIL NFR BLD: 2.5 %
FERRITIN SERPL IA-MCNC: 316 NG/ML (ref 30–400)
FOLATE SERPL-MCNC: >40 NG/ML (ref 4.78–24.2)
GFR SERPLBLD CREATININE-BSD FMLA CKD-EPI: 63 ML/MIN/{1.73_M2}
GLUCOSE SERPL-MCNC: 88 MG/DL (ref 70–99)
HCT VFR BLD AUTO: 36.3 % (ref 40.5–52.5)
HDLC SERPL-MCNC: 48 MG/DL (ref 40–60)
HGB BLD-MCNC: 12.2 G/DL (ref 13.5–17.5)
IMMATURE RETIC FRACT: 0.32 (ref 0.21–0.37)
IRON SATN MFR SERPL: 31 % (ref 20–50)
IRON SERPL-MCNC: 87 UG/DL (ref 59–158)
LDLC SERPL CALC-MCNC: 72 MG/DL
LYMPHOCYTES # BLD: 1.3 K/UL (ref 1–5.1)
LYMPHOCYTES NFR BLD: 18.1 %
MCH RBC QN AUTO: 33.7 PG (ref 26–34)
MCHC RBC AUTO-ENTMCNC: 33.8 G/DL (ref 31–36)
MCV RBC AUTO: 99.7 FL (ref 80–100)
MONOCYTES # BLD: 0.9 K/UL (ref 0–1.3)
MONOCYTES NFR BLD: 13.2 %
NEUTROPHILS # BLD: 4.6 K/UL (ref 1.7–7.7)
NEUTROPHILS NFR BLD: 65.3 %
PLATELET # BLD AUTO: 351 K/UL (ref 135–450)
PMV BLD AUTO: 8.3 FL (ref 5–10.5)
POTASSIUM SERPL-SCNC: 4.7 MMOL/L (ref 3.5–5.1)
RBC # BLD AUTO: 3.64 M/UL (ref 4.2–5.9)
RETICS # AUTO: 0.04 M/UL
RETICS/RBC NFR AUTO: 1.16 % (ref 0.5–2.18)
SODIUM SERPL-SCNC: 140 MMOL/L (ref 136–145)
TIBC SERPL-MCNC: 282 UG/DL (ref 260–445)
TRIGL SERPL-MCNC: 79 MG/DL (ref 0–150)
VIT B12 SERPL-MCNC: 1007 PG/ML (ref 211–911)
VLDLC SERPL CALC-MCNC: 16 MG/DL
WBC # BLD AUTO: 7 K/UL (ref 4–11)

## 2024-09-30 PROCEDURE — 1123F ACP DISCUSS/DSCN MKR DOCD: CPT | Performed by: INTERNAL MEDICINE

## 2024-09-30 PROCEDURE — 3075F SYST BP GE 130 - 139MM HG: CPT | Performed by: INTERNAL MEDICINE

## 2024-09-30 PROCEDURE — G2211 COMPLEX E/M VISIT ADD ON: HCPCS | Performed by: INTERNAL MEDICINE

## 2024-09-30 PROCEDURE — 99214 OFFICE O/P EST MOD 30 MIN: CPT | Performed by: INTERNAL MEDICINE

## 2024-09-30 PROCEDURE — 3079F DIAST BP 80-89 MM HG: CPT | Performed by: INTERNAL MEDICINE

## 2024-09-30 RX ORDER — CARVEDILOL 12.5 MG/1
12.5 TABLET ORAL 2 TIMES DAILY WITH MEALS
Qty: 90 TABLET | Refills: 3 | Status: SHIPPED | OUTPATIENT
Start: 2024-09-30

## 2024-09-30 RX ORDER — DILTIAZEM HYDROCHLORIDE 240 MG/1
240 CAPSULE, EXTENDED RELEASE ORAL DAILY
Qty: 90 CAPSULE | Refills: 3 | Status: SHIPPED | OUTPATIENT
Start: 2024-09-30

## 2024-09-30 RX ORDER — ATORVASTATIN CALCIUM 20 MG/1
20 TABLET, FILM COATED ORAL NIGHTLY
Qty: 90 TABLET | Refills: 3 | Status: SHIPPED | OUTPATIENT
Start: 2024-09-30

## 2024-09-30 RX ORDER — POTASSIUM CHLORIDE 1500 MG/1
20 TABLET, EXTENDED RELEASE ORAL 2 TIMES DAILY
Qty: 180 TABLET | Refills: 3 | Status: SHIPPED | OUTPATIENT
Start: 2024-09-30

## 2024-09-30 ASSESSMENT — ENCOUNTER SYMPTOMS
SHORTNESS OF BREATH: 0
CHEST TIGHTNESS: 0
SINUS PAIN: 0
COLOR CHANGE: 0
BLOOD IN STOOL: 0
COUGH: 0
CONSTIPATION: 0
WHEEZING: 0
ABDOMINAL PAIN: 0

## 2024-09-30 NOTE — PROGRESS NOTES
Randy Sanchez (:  1950) is a 74 y.o. male,Established patient, here for evaluation of the following chief complaint(s):  6 Month Follow-Up      Assessment & Plan   ASSESSMENT/PLAN:  1. Secondary hypercoagulable state (HCC)  2. Paroxysmal atrial fibrillation (HCC)  -     apixaban (ELIQUIS) 5 MG TABS tablet; TAKE 1 TABLET TWICE A DAY, Disp-180 tablet, R-3Normal  3. Mixed stress and urge urinary incontinence  -     atorvastatin (LIPITOR) 20 MG tablet; Take 1 tablet by mouth nightly, Disp-90 tablet, R-3Normal  4. Hypertension goal BP (blood pressure) < 130/80  -     dilTIAZem (TIAZAC) 240 MG extended release capsule; Take 1 capsule by mouth daily, Disp-90 capsule, R-3Normal  -     potassium chloride (KLOR-CON M20) 20 MEQ extended release tablet; Take 1 tablet by mouth 2 times daily, Disp-180 tablet, R-3Normal  -     Basic Metabolic Panel; Future  5. Prostate cancer (HCC)  6. B12 deficiency  -     Vitamin B12 & Folate; Future  7. Iron deficiency anemia secondary to inadequate dietary iron intake  -     CBC with Auto Differential; Future  -     Reticulocytes; Future  -     Iron and TIBC; Future  -     Ferritin; Future  -     Vitamin B12 & Folate; Future  8. Mixed hyperlipidemia  -     Lipid Panel; Future  Overall patient seems to be doing significantly better he is working on getting stronger again and working at home and with rehab    Patient blood pressure was elevated initial presentation after resting him he did go back to his baseline and is doing fairly well    Patient is still in A-fib he is on Eliquis and seems to be doing fairly well on that    Patient is taking atorvastatin for his cholesterol that is stable and will continue same treatment    We will check the patient blood test today to see where he stands    No follow-ups on file.         Subjective   SUBJECTIVE/OBJECTIVE:    Lab Review   Lab Results   Component Value Date/Time     2024 11:51 AM     2024 09:39 AM

## 2024-10-02 NOTE — TELEPHONE ENCOUNTER
From: Randy Sanchez  To: Dr. Colby Stevens  Sent: 1/31/2024 6:24 PM EST  Subject: Medication refill    [facility] pharmacy sent me an e-mail, telling me that I have no more refills left for three of my medications: Chlorthalidone, Diltiazem, and Atorvastatin. All were refilled in December, and will run out before I see you in April.  They have a new fax # to call in a refill authorization: [phone].  Thank you, looking forward to seeing you in April.  
No

## 2024-10-07 ENCOUNTER — HOSPITAL ENCOUNTER (OUTPATIENT)
Dept: PHYSICAL THERAPY | Age: 74
Setting detail: THERAPIES SERIES
Discharge: HOME | End: 2024-10-07
Payer: MEDICARE

## 2024-10-07 DIAGNOSIS — R29.898 WEAKNESS OF LEFT LOWER EXTREMITY: ICD-10-CM

## 2024-10-07 DIAGNOSIS — R26.2 DIFFICULTY WALKING: Primary | ICD-10-CM

## 2024-10-07 PROCEDURE — 97530 THERAPEUTIC ACTIVITIES: CPT

## 2024-10-07 PROCEDURE — 97161 PT EVAL LOW COMPLEX 20 MIN: CPT

## 2024-10-09 ENCOUNTER — NURSE ONLY (OUTPATIENT)
Dept: INTERNAL MEDICINE CLINIC | Age: 74
End: 2024-10-09
Payer: MEDICARE

## 2024-10-09 DIAGNOSIS — Z23 NEEDS FLU SHOT: Primary | ICD-10-CM

## 2024-10-09 PROCEDURE — 90653 IIV ADJUVANT VACCINE IM: CPT | Performed by: INTERNAL MEDICINE

## 2024-10-09 PROCEDURE — G0008 ADMIN INFLUENZA VIRUS VAC: HCPCS | Performed by: INTERNAL MEDICINE

## 2024-10-11 ENCOUNTER — HOSPITAL ENCOUNTER (OUTPATIENT)
Dept: PHYSICAL THERAPY | Age: 74
Setting detail: THERAPIES SERIES
Discharge: HOME | End: 2024-10-11
Payer: MEDICARE

## 2024-10-11 PROCEDURE — 97110 THERAPEUTIC EXERCISES: CPT

## 2024-10-11 NOTE — PLAN OF CARE
[facility] - Outpatient Rehabilitation and Therapy [address], [place] [address] office: [phone] fax: [phone]       Physical Therapy: TREATMENT/PROGRESS NOTE   Patient: Randy Sanchez (74 y.o. male)   Examination Date: 10/11/2024   :  1950 MRN: [id number]   Visit #:  (10/7 - )  Insurance Allowable Auth Needed   BMN [x]Yes - [employer]    []No    Insurance: Payor: [facility] MEDICARE / Plan: ANTHEM MEDIBLUE ESSENTIAL/PLUS / Product Type: *No Product type* /   Insurance ID: [id number] - (Medicare Managed)  Secondary Insurance (if applicable):    Treatment Diagnosis: -    ICD-10-CM    1. Difficulty walking  R26.2       2. Weakness of left lower extremity  R29.898          Medical Diagnosis:  Z87.81 (ICD-10-CM) - S/p left hip fracture    Referring Physician: Page Randolph MD  PCP: Colby Stevens MD     Plan of care signed (Y/N):     Date of Patient follow up with Physician:      Plan of Care Report: NO  POC update due: (10 visits /OR AUTH LIMITS, whichever is less) - 2024                                             Medical History:  Comorbidities:  Other: A-fib, HTN, Hx of mild CP  Relevant Medical History: Mild CP                                         Precautions/ Contra-indications:           Latex allergy:  NO  Pacemaker:    NO  Contraindications for Manipulation: None  Date of Surgery: 24  Other:    Red Flags:  None    Suicide Screening:   The patient did not verbalize a primary behavioral concern, suicidal ideation, suicidal intent, or demonstrate suicidal behaviors.    Preferred Language for Healthcare:   [x] English       [] other:    SUBJECTIVE EXAMINATION     Patient stated complaint: Pt reports he was really sore after last session for a few days but that has resolved. Pt has noticed that his L knee has been buckling over the past day or so unsure why.         Test used Initial score  10/7/24 10/11/2024   Pain Summary VAS 0-3 0   Functional

## 2024-10-15 ENCOUNTER — HOSPITAL ENCOUNTER (OUTPATIENT)
Dept: PHYSICAL THERAPY | Age: 74
Setting detail: THERAPIES SERIES
Discharge: HOME | End: 2024-10-15
Payer: MEDICARE

## 2024-10-15 PROCEDURE — 97112 NEUROMUSCULAR REEDUCATION: CPT

## 2024-10-15 PROCEDURE — 97110 THERAPEUTIC EXERCISES: CPT

## 2024-10-15 NOTE — FLOWSHEET NOTE
[facility] - Outpatient Rehabilitation and Therapy [address], [place] [address] office: [phone] fax: [phone]       Physical Therapy: TREATMENT/PROGRESS NOTE   Patient: Randy Sanchez (74 y.o. male)   Examination Date: 10/15/2024   :  1950 MRN: [id number]   Visit #: 3 /11 (10/7 - )  Insurance Allowable Auth Needed   BMN [x]Yes - [employer]    []No    Insurance: Payor: [facility] MEDICARE / Plan: ANTHEM MEDIBLUE ESSENTIAL/PLUS / Product Type: *No Product type* /   Insurance ID: [id number] - (Medicare Managed)  Secondary Insurance (if applicable):    Treatment Diagnosis: -    ICD-10-CM    1. Difficulty walking  R26.2       2. Weakness of left lower extremity  R29.898          Medical Diagnosis:  Z87.81 (ICD-10-CM) - S/p left hip fracture    Referring Physician: Page Randolph MD  PCP: Colby Stevens MD     Plan of care signed (Y/N):     Date of Patient follow up with Physician:      Plan of Care Report: NO  POC update due: (10 visits /OR AUTH LIMITS, whichever is less) - 2024                                             Medical History:  Comorbidities:  Other: A-fib, HTN, Hx of mild CP  Relevant Medical History: Mild CP                                         Precautions/ Contra-indications:           Latex allergy:  NO  Pacemaker:    NO  Contraindications for Manipulation: None  Date of Surgery: 24  Other:    Red Flags:  None    Suicide Screening:   The patient did not verbalize a primary behavioral concern, suicidal ideation, suicidal intent, or demonstrate suicidal behaviors.    Preferred Language for Healthcare:   [x] English       [] other:    SUBJECTIVE EXAMINATION     Patient stated complaint: pt reports he did OK after last session and was able to get ankle weights for his home which he has been using.        Test used Initial score  10/7/24 10/15/2024   Pain Summary VAS 0-3 0   Functional questionnaire WOMAC 12 / 13%    Other:              Pain:  Pain

## 2024-10-18 ENCOUNTER — HOSPITAL ENCOUNTER (OUTPATIENT)
Dept: PHYSICAL THERAPY | Age: 74
Setting detail: THERAPIES SERIES
Discharge: HOME | End: 2024-10-18
Payer: MEDICARE

## 2024-10-18 PROCEDURE — 97112 NEUROMUSCULAR REEDUCATION: CPT

## 2024-10-18 PROCEDURE — 97110 THERAPEUTIC EXERCISES: CPT

## 2024-10-18 NOTE — FLOWSHEET NOTE
location: front of L hip   Patient describes pain to be intermittent, dull, and aching  Pain decreases with: Resting  Pain increases with: Stretching     Living status: lives at home with his wife, no stairs    Occupation/School:  Work/School Status: Retired  Job Duties/Demands: NA    Hand Dominance: Right    Sport/ Recreation/ Leisure/ Hobbies: n/a    Review Of Systems (ROS):  [x] Performed Review of systems (Integumentary, CardioPulmonary, Neurological) by intake and observation. Intake form has been scanned into medical record. Patient has been instructed to contact their primary care physician regarding ROS issues if not already being addressed at this time.    [x] Patient history, allergies, meds reviewed. Medical chart reviewed. See intake form.     OBJECTIVE EXAMINATION     10/7/24  ROM/Strength: (Blank cells denote NT)      Mvmt (norm) AROM L AROM R Notes PROM L PROM R Notes     LUMBAR Flex (90)         Ext (25)         SB (25)          Rotation (30)             HIP Flex (120) 95 100        Abd (45) 20 35        ER (50) 25 38        IR (45) 10 30        Ext (20)         KNEE Flex (140) 120 120        Ext (0) 0 0         ANKLE DF (20)          PF (50)          Inversion (30)          Eversion (20)             MMT L          MMT  R Notes     LUMBAR  Flexion       Extension       Lateral flexion        Rotation          MMT L MMT R Notes       HIP  Flexion 4 4+      Abduction 3 4-      ER 4- 4+      IR 4- 4+      Extension      KNEE  Flexion 55.3# 56.4#      Extension 33.6# 44.6#      ANKLE  DF        PF        Inversion        Eversion        Repeated Movements: [] Normal  [] Abnormal [] N/A    Palpation:   Unremarkable  Location:-    Posture:   Pt stands in forward flexed posture with B knee flexion in standing    Bandages/Dressings/Incisions:  Patients wound/incision appears to be healing as expected  Patients wound/incision appears clean, dry and intact    Dermatomes: Abnormal findings listed

## 2024-10-21 ENCOUNTER — HOSPITAL ENCOUNTER (OUTPATIENT)
Dept: PHYSICAL THERAPY | Age: 74
Setting detail: THERAPIES SERIES
Discharge: HOME | End: 2024-10-21
Payer: MEDICARE

## 2024-10-21 PROCEDURE — 97110 THERAPEUTIC EXERCISES: CPT

## 2024-10-21 NOTE — FLOWSHEET NOTE
[facility] - Outpatient Rehabilitation and Therapy [address], [place] [address] office: [phone] fax: [phone]       Physical Therapy: TREATMENT/PROGRESS NOTE   Patient: Randy Sanchez (74 y.o. male)   Examination Date: 10/21/2024   :  1950 MRN: [id number]   Visit #:  (10/7 - )  Insurance Allowable Auth Needed   BMN [x]Yes - Carelon    []No    Insurance: Payor: [facility] MEDICARE / Plan: ANTHEM MEDIBLUE ESSENTIAL/PLUS / Product Type: *No Product type* /   Insurance ID: [id number] - (Medicare Managed)  Secondary Insurance (if applicable):    Treatment Diagnosis: -    ICD-10-CM    1. Difficulty walking  R26.2       2. Weakness of left lower extremity  R29.898          Medical Diagnosis:  Z87.81 (ICD-10-CM) - S/p left hip fracture    Referring Physician: Page Randolph MD  PCP: Colby Stevens MD     Plan of care signed (Y/N):     Date of Patient follow up with Physician:      Plan of Care Report: NO  POC update due: (10 visits /OR AUTH LIMITS, whichever is less) - 2024                                             Medical History:  Comorbidities:  Other: A-fib, HTN, Hx of mild CP  Relevant Medical History: Mild CP                                         Precautions/ Contra-indications:           Latex allergy:  NO  Pacemaker:    NO  Contraindications for Manipulation: None  Date of Surgery: 24  Other:    Red Flags:  None    Suicide Screening:   The patient did not verbalize a primary behavioral concern, suicidal ideation, suicidal intent, or demonstrate suicidal behaviors.    Preferred Language for Healthcare:   [x] English       [] other:    SUBJECTIVE EXAMINATION     Patient stated complaint: Pt reports he continues to do well, no issues since last session and has continued to work on HEP to build strength.        Test used Initial score  10/7/24 10/21/2024   Pain Summary VAS 0-3 0/10   Functional questionnaire WOMAC 12 / 13%    Other:

## 2024-10-25 ENCOUNTER — HOSPITAL ENCOUNTER (OUTPATIENT)
Dept: PHYSICAL THERAPY | Age: 74
Setting detail: THERAPIES SERIES
Discharge: HOME | End: 2024-10-25
Payer: MEDICARE

## 2024-10-25 PROCEDURE — 97110 THERAPEUTIC EXERCISES: CPT

## 2024-10-25 PROCEDURE — 97112 NEUROMUSCULAR REEDUCATION: CPT

## 2024-10-25 NOTE — FLOWSHEET NOTE
[facility] - Outpatient Rehabilitation and Therapy [address], [place] [address] office: [phone] fax: [phone]       Physical Therapy: TREATMENT/PROGRESS NOTE   Patient: Randy Sanchez (74 y.o. male)   Examination Date: 10/25/2024   :  1950 MRN: [id number]   Visit #:  (10/7 - )  Insurance Allowable Auth Needed   BMN [x]Yes - Carelon    []No    Insurance: Payor: [facility] MEDICARE / Plan: ANTHEM MEDIBLUE ESSENTIAL/PLUS / Product Type: *No Product type* /   Insurance ID: [id number] - (Medicare Managed)  Secondary Insurance (if applicable):    Treatment Diagnosis: -    ICD-10-CM    1. Difficulty walking  R26.2       2. Weakness of left lower extremity  R29.898          Medical Diagnosis:  Z87.81 (ICD-10-CM) - S/p left hip fracture    Referring Physician: Page Randolph MD  PCP: Colby Stevens MD     Plan of care signed (Y/N):     Date of Patient follow up with Physician:      Plan of Care Report: NO  POC update due: (10 visits /OR AUTH LIMITS, whichever is less) - 2024                                             Medical History:  Comorbidities:  Other: A-fib, HTN, Hx of mild CP  Relevant Medical History: Mild CP                                         Precautions/ Contra-indications:           Latex allergy:  NO  Pacemaker:    NO  Contraindications for Manipulation: None  Date of Surgery: 24  Other:    Red Flags:  None    Suicide Screening:   The patient did not verbalize a primary behavioral concern, suicidal ideation, suicidal intent, or demonstrate suicidal behaviors.    Preferred Language for Healthcare:   [x] English       [] other:    SUBJECTIVE EXAMINATION     Patient stated complaint: Pt reports he has continued to do well, has been able to move to his chair in his living room for sit to stands which are lower than what he has been doing and was successful in completing them without his hands.        Test used Initial score  10/7/24 10/25/2024

## 2024-10-28 ENCOUNTER — HOSPITAL ENCOUNTER (OUTPATIENT)
Dept: PHYSICAL THERAPY | Age: 74
Setting detail: THERAPIES SERIES
Discharge: HOME | End: 2024-10-28
Payer: MEDICARE

## 2024-10-28 PROCEDURE — 97110 THERAPEUTIC EXERCISES: CPT

## 2024-10-28 PROCEDURE — 97112 NEUROMUSCULAR REEDUCATION: CPT

## 2024-10-28 NOTE — FLOWSHEET NOTE
[facility] - Outpatient Rehabilitation and Therapy [address], [place] [address] office: [phone] fax: [phone]       Physical Therapy: TREATMENT/PROGRESS NOTE   Patient: Randy Sanchez (74 y.o. male)   Examination Date: 10/28/2024   :  1950 MRN: [id number]   Visit #:  (10/7 - )  Insurance Allowable Auth Needed   BMN [x]Yes - Carelon    []No    Insurance: Payor: [facility] MEDICARE / Plan: ANTHEM MEDIBLUE ESSENTIAL/PLUS / Product Type: *No Product type* /   Insurance ID: [id number] - (Medicare Managed)  Secondary Insurance (if applicable):    Treatment Diagnosis: -    ICD-10-CM    1. Difficulty walking  R26.2       2. Weakness of left lower extremity  R29.898          Medical Diagnosis:  Z87.81 (ICD-10-CM) - S/p left hip fracture    Referring Physician: Page Randolph MD  PCP: Colby Stevens MD     Plan of care signed (Y/N):     Date of Patient follow up with Physician:      Plan of Care Report: NO  POC update due: (10 visits /OR AUTH LIMITS, whichever is less) - 2024                                             Medical History:  Comorbidities:  Other: A-fib, HTN, Hx of mild CP  Relevant Medical History: Mild CP                                         Precautions/ Contra-indications:           Latex allergy:  NO  Pacemaker:    NO  Contraindications for Manipulation: None  Date of Surgery: 24  Other:    Red Flags:  None    Suicide Screening:   The patient did not verbalize a primary behavioral concern, suicidal ideation, suicidal intent, or demonstrate suicidal behaviors.    Preferred Language for Healthcare:   [x] English       [] other:    SUBJECTIVE EXAMINATION     Patient stated complaint: Pt reports he continues to note progress. He was able to help his wife get sheets on his bed kneeling down and popping right back up with little effort that used to be very difficult before his fall. His walking at home is getting better as well as he has been

## 2024-10-31 ENCOUNTER — HOSPITAL ENCOUNTER (OUTPATIENT)
Dept: PHYSICAL THERAPY | Age: 74
Setting detail: THERAPIES SERIES
Discharge: HOME | End: 2024-10-31
Payer: MEDICARE

## 2024-10-31 PROCEDURE — 97112 NEUROMUSCULAR REEDUCATION: CPT

## 2024-10-31 PROCEDURE — 97110 THERAPEUTIC EXERCISES: CPT

## 2024-10-31 NOTE — FLOWSHEET NOTE
[facility] - Outpatient Rehabilitation and Therapy [address], [place] [address] office: [phone] fax: [phone]       Physical Therapy: TREATMENT/PROGRESS NOTE   Patient: Randy Sanchez (74 y.o. male)   Examination Date: 10/31/2024   :  1950 MRN: [id number]   Visit #:  (10/7 - )  Insurance Allowable Auth Needed   BMN [x]Yes - Carelon    []No    Insurance: Payor: [facility] MEDICARE / Plan: ANTHEM MEDIBLUE ESSENTIAL/PLUS / Product Type: *No Product type* /   Insurance ID: [id number] - (Medicare Managed)  Secondary Insurance (if applicable):    Treatment Diagnosis: -    ICD-10-CM    1. Difficulty walking  R26.2       2. Weakness of left lower extremity  R29.898          Medical Diagnosis:  Z87.81 (ICD-10-CM) - S/p left hip fracture    Referring Physician: Page Randolph MD  PCP: Colby Stevens MD     Plan of care signed (Y/N):     Date of Patient follow up with Physician:      Plan of Care Report: NO  POC update due: (10 visits /OR AUTH LIMITS, whichever is less) - 2024                                             Medical History:  Comorbidities:  Other: A-fib, HTN, Hx of mild CP  Relevant Medical History: Mild CP                                         Precautions/ Contra-indications:           Latex allergy:  NO  Pacemaker:    NO  Contraindications for Manipulation: None  Date of Surgery: 24  Other:    Red Flags:  None    Suicide Screening:   The patient did not verbalize a primary behavioral concern, suicidal ideation, suicidal intent, or demonstrate suicidal behaviors.    Preferred Language for Healthcare:   [x] English       [] other:    SUBJECTIVE EXAMINATION     Patient stated complaint: Pt states a few of his grand kids that live with them are sick so trying to help to care for them has resulted in less compliance with HEP but is trying to do what he can. Has been excited with the leg press machine how strength he is getting.        Test used

## 2024-11-06 ENCOUNTER — HOSPITAL ENCOUNTER (OUTPATIENT)
Dept: PHYSICAL THERAPY | Age: 74
Setting detail: THERAPIES SERIES
Discharge: HOME | End: 2024-11-06
Payer: MEDICARE

## 2024-11-06 PROCEDURE — 97110 THERAPEUTIC EXERCISES: CPT

## 2024-11-06 PROCEDURE — 97112 NEUROMUSCULAR REEDUCATION: CPT

## 2024-11-06 NOTE — FLOWSHEET NOTE
procedure, 1 or more areas, each 15 minutes; neuromuscular reeducation of movement, balance, coordination, kinesthetic sense, posture, and/or proprioception for sitting and/or standing activities    GOALS     Patient stated goal: improve strength to fully regain independence and walk with his walking stick   [] Progressing: [] Met: [] Not Met: [] Adjusted    Therapist goals for Patient:   Short Term Goals: To be achieved in: 2 weeks  1. Independent in HEP and progression per patient tolerance, in order to prevent re-injury.   [x] Progressing: [] Met: [] Not Met: [] Adjusted  2. Patient will have a decrease in pain to <0/10 to facilitate improvement in movement, function, and ADLs as indicated by Functional Deficits.  [] Progressing: [x] Met: [] Not Met: [] Adjusted    Long Term Goals: To be achieved in: 8 weeks  1. Disability index score of 6% or less for the WOMAC to assist with reaching prior level of function with activities such as lifting and carrying laundry basket and taking out trash.  [] Progressing: [] Met: [] Not Met: [] Adjusted  2. Patient will demonstrate increased AROM of L hip flexion to 110 without pain to allow for proper joint functioning to enable patient to don/doff shoes and socks on L.   [] Progressing: [] Met: [] Not Met: [] Adjusted  3. Patient will demonstrate increased Strength of L quad to within 5lb with HHD of contralateral limb to allow for proper functional mobility to enable patient to return to sit to stand with NO UE A.   [] Progressing: [] Met: [] Not Met: [] Adjusted  4. Patient will return to walking with his walking stick only without increased symptoms or restriction.   [] Progressing: [] Met: [] Not Met: [] Adjusted  5. Patient will be able to ambulate community distances with LRAD and no pain.     [] Progressing: [] Met: [] Not Met: [] Adjusted     Overall Progression Towards Functional goals/ Treatment Progress Update:  [] Patient is progressing as expected towards

## 2024-11-08 ENCOUNTER — HOSPITAL ENCOUNTER (OUTPATIENT)
Dept: PHYSICAL THERAPY | Age: 74
Setting detail: THERAPIES SERIES
Discharge: HOME | End: 2024-11-08
Payer: MEDICARE

## 2024-11-08 PROCEDURE — 97112 NEUROMUSCULAR REEDUCATION: CPT

## 2024-11-08 PROCEDURE — 97110 THERAPEUTIC EXERCISES: CPT

## 2024-11-08 NOTE — FLOWSHEET NOTE
B UE A Standing hip ext with B UE A Forward toe taps on step with B UE A Blaze pods:   - UE taps 2 on R and 2 on L    -       -   -   - UE taps with staggered stance      - UE tap with L LE step and return with UE on plinth      -UE taps with side step and return (lateral pods placed on steps length wise sideways)          - B UE ball taps     Focus, 2 distractor  Random; 1 Color; 2 pods only    Random 1 Color   40\"    40\"      45\" with 2\" delay      45\"  X1     X2 each stance  X2        x2 , ^10/25 staggered stance and time, ^10/31 side step                                      Therapeutic Activity (93483)  Sets/time                                          Modalities:    No modalities applied this session    Education/Home Exercise Program: HEP instruction not indicated at this time. HEP from Home Health PT was reivewed with pt as well as additions of bridges, side stepping at counter and side step ups with 3 inch step at home. Pt declined need for handout. X10 min  10/7/24 Pt was educated on PT POC, Diagnosis, Prognosis, pathomechanics as well as frequency and duration of scheduling future physical therapy appointments. Time was also taken on this day to answer all patient questions and participation in PT. 5 min      ASSESSMENT     Today's Assessment: Pt did well with session today. Pt continues with progressions with weights as noted. Advancement as well with Blaze Pod activity to continue to incorporate more stepping strategies for dynamic movement and reaching which pt did well well today. More uncertainty with movement however pt was better to able to adjust and coordinate movement patterns compared to weeks prior. Will continue to work to build on strength and dynamic balance and control as able.     Medical Necessity Documentation:  I certify that this patient meets the below criteria necessary for medical necessity for care and/or justification of therapy services:  The patient has functional impairments

## 2024-11-11 ENCOUNTER — APPOINTMENT (OUTPATIENT)
Dept: PHYSICAL THERAPY | Age: 74
End: 2024-11-11
Payer: MEDICARE

## 2024-11-15 ENCOUNTER — PATIENT MESSAGE (OUTPATIENT)
Dept: INTERNAL MEDICINE CLINIC | Age: 74
End: 2024-11-15

## 2024-11-15 ENCOUNTER — HOSPITAL ENCOUNTER (OUTPATIENT)
Dept: PHYSICAL THERAPY | Age: 74
Setting detail: THERAPIES SERIES
Discharge: HOME | End: 2024-11-15
Payer: MEDICARE

## 2024-11-15 DIAGNOSIS — N39.46 MIXED STRESS AND URGE URINARY INCONTINENCE: ICD-10-CM

## 2024-11-15 PROCEDURE — 97110 THERAPEUTIC EXERCISES: CPT

## 2024-11-15 PROCEDURE — 97530 THERAPEUTIC ACTIVITIES: CPT

## 2024-11-15 RX ORDER — TOLTERODINE 4 MG/1
CAPSULE, EXTENDED RELEASE ORAL
Qty: 90 CAPSULE | Refills: 1 | Status: SHIPPED | OUTPATIENT
Start: 2024-11-15

## 2024-11-15 NOTE — PLAN OF CARE
mobility to enable patient to return to sit to stand with NO UE A.   [] Progressing: [] Met: [x] Not Met: [] Adjusted  4. Patient will return to walking with his walking stick only without increased symptoms or restriction.   [] Progressing: [] Met: [x] Not Met: [] Adjusted  5. Patient will be able to ambulate community distances with LRAD and no pain.     [] Progressing: [] Met: [x] Not Met: [] Adjusted     Overall Progression Towards Functional goals/ Treatment Progress Update:  [] Patient is progressing as expected towards functional goals listed.    [] Progression is slowed due to complexities/Impairments listed.  [] Progression has been slowed due to co-morbidities.  [x] Plan just implemented, too soon (<30days) to assess goals progression   [] Goals require adjustment due to lack of progress  [] Patient is not progressing as expected and requires additional follow up with physician  [] Other:     TREATMENT PLAN     Frequency/Duration: 1-2x/week for 8 weeks for the following treatment interventions:    Interventions:  Therapeutic Exercise (66379) including: strength training, ROM, and functional mobility  Therapeutic Activities (39134) including: functional mobility training and education.  Neuromuscular Re-education (56595) activation and proprioception, including postural re-education.    Manual Therapy (88519) as indicated to include: Passive Range of Motion and Gr I-IV mobilizations  Modalities as needed that may include: Cryotherapy and Vasoneumatic Compression  Patient education on postural re-education, activity modification, and progression of HEP    Plan: Continue with LE strength and stepping strategies for balance and standing tolerance.     Electronically Signed by Robbin Parker, PT, DPT, OCS, OMT-C Date: 11/15/2024     Note: Portions of this note have been templated and/or copied from initial evaluation, reassessments and prior notes for documentation efficiency.    Note: If patient does not return

## 2024-11-19 NOTE — PROGRESS NOTES
Homeless in the Last Year: No       Family History:   Family History   Problem Relation Age of Onset    Alcohol Abuse Mother     Anemia Mother     Alcohol Abuse Father     Coronary Art Dis Father     Heart Disease Father      Family history has been reviewed and not pertinent except as noted above.     Review of Systems:   Constitutional: there has been no unanticipated weight loss. No change in energy or activity level   Eyes: No visual changes   ENT: No Headaches, hearing loss or vertigo. No mouth sores or sore throat.  Cardiovascular: Reviewed in HPI  Respiratory: No cough or wheezing, no sputum production.   Gastrointestinal: No abdominal pain, appetite loss, blood in stools. No change in bowel or bladder habits.  Genitourinary: No nocturia, dysuria, trouble voiding  Musculoskeletal:  No gait disturbance, weakness or joint complaints.  Integumentary: No rash or pruritis.  Neurological: No headache, change in muscle strength, numbness or tingling. No change in gait, balance, coordination, mood, affect, memory, mentation, behavior.  Psychiatric: No anxiety or depression  Endocrine: No malaise or fever  Hematologic/Lymphatic: No abnormal bruising or bleeding, blood clots or swollen lymph nodes.  Allergic/Immunologic: No nasal congestion or hives.    Physical Examination:    Vitals:    11/20/24 1020   BP: 126/86   Site: Left Upper Arm   Position: Sitting   Cuff Size: Medium Adult   Pulse: (!) 101   SpO2: 96%   Weight: 102.1 kg (225 lb)   Height: 1.778 m (5' 10\")     Body mass index is 32.28 kg/m².     Wt Readings from Last 3 Encounters:   11/20/24 102.1 kg (225 lb)   09/30/24 100.7 kg (222 lb)   04/01/24 99.3 kg (219 lb)      BP Readings from Last 3 Encounters:   11/20/24 126/86   09/30/24 136/80   08/23/24 122/70        Physical Examination:    CONSTITUTIONAL: Well developed, well nourished  EYES: PERRLA. No xanthelasma, sclera non icteric  EARS,NOSE,MOUTH,THROAT:  Mucous membranes moist, normal hearing  NECK:

## 2024-11-20 ENCOUNTER — HOSPITAL ENCOUNTER (OUTPATIENT)
Dept: PHYSICAL THERAPY | Age: 74
Setting detail: THERAPIES SERIES
Discharge: HOME | End: 2024-11-20
Payer: MEDICARE

## 2024-11-20 ENCOUNTER — OFFICE VISIT (OUTPATIENT)
Dept: CARDIOLOGY CLINIC | Age: 74
End: 2024-11-20
Payer: MEDICARE

## 2024-11-20 VITALS
SYSTOLIC BLOOD PRESSURE: 126 MMHG | HEART RATE: 101 BPM | WEIGHT: 225 LBS | HEIGHT: 70 IN | DIASTOLIC BLOOD PRESSURE: 86 MMHG | BODY MASS INDEX: 32.21 KG/M2 | OXYGEN SATURATION: 96 %

## 2024-11-20 DIAGNOSIS — I10 ESSENTIAL HYPERTENSION: ICD-10-CM

## 2024-11-20 DIAGNOSIS — N18.30 STAGE 3 CHRONIC KIDNEY DISEASE, UNSPECIFIED WHETHER STAGE 3A OR 3B CKD (HCC): ICD-10-CM

## 2024-11-20 DIAGNOSIS — I10 HYPERTENSION GOAL BP (BLOOD PRESSURE) < 130/80: ICD-10-CM

## 2024-11-20 DIAGNOSIS — E78.2 MIXED HYPERLIPIDEMIA: ICD-10-CM

## 2024-11-20 DIAGNOSIS — I48.0 PAROXYSMAL ATRIAL FIBRILLATION (HCC): Primary | ICD-10-CM

## 2024-11-20 PROCEDURE — 97110 THERAPEUTIC EXERCISES: CPT

## 2024-11-20 PROCEDURE — 93000 ELECTROCARDIOGRAM COMPLETE: CPT | Performed by: INTERNAL MEDICINE

## 2024-11-20 PROCEDURE — 3079F DIAST BP 80-89 MM HG: CPT | Performed by: INTERNAL MEDICINE

## 2024-11-20 PROCEDURE — 1123F ACP DISCUSS/DSCN MKR DOCD: CPT | Performed by: INTERNAL MEDICINE

## 2024-11-20 PROCEDURE — 97112 NEUROMUSCULAR REEDUCATION: CPT

## 2024-11-20 PROCEDURE — 1159F MED LIST DOCD IN RCRD: CPT | Performed by: INTERNAL MEDICINE

## 2024-11-20 PROCEDURE — 99214 OFFICE O/P EST MOD 30 MIN: CPT | Performed by: INTERNAL MEDICINE

## 2024-11-20 PROCEDURE — 3074F SYST BP LT 130 MM HG: CPT | Performed by: INTERNAL MEDICINE

## 2024-11-20 RX ORDER — DILTIAZEM HYDROCHLORIDE 360 MG/1
360 CAPSULE, EXTENDED RELEASE ORAL DAILY
Qty: 90 CAPSULE | Refills: 3 | Status: SHIPPED | OUTPATIENT
Start: 2024-11-20

## 2024-11-20 NOTE — PLAN OF CARE
wound/incision appears to be healing as expected  Patients wound/incision appears clean, dry and intact    Dermatomes: Abnormal findings listed below  NT    Myotomes: Abnormal findings listed below  NT    Reflexes: Abnormal findings listed below  Not Tested    Specific Joint Mobility Testing/Accessory Motions:      N/A    Special Tests:  N/A    Gait:    Pattern:  Decreased step length, decreased TKE at mid stance B, decreased hip ext on L>R   Assistive Device Used: Rolling walker (RW)    Balance:  [x] WNL      [] NT       [] Dysfunction noted  Comment:     Falls Risk Assessment (30 days):   Falls Risk assessed and patient requires intervention due to being higher risk   Time Up and Go (TUG):   21-22 seconds (At least 80% but less than 100% functional limitation)      10 Meter Walk Test: 0.5 m/s with RW      Exercises/Interventions     Therapeutic Ex (35269)  resistance Sets/time Reps Notes/Cues/Progressions   / NuStep   5 min Supine SLR SAQ Hooklying hip abd LAQ Seated LAQ with SLR Seated hip ABD Forward lunge with B UE A STS     2 inch above chair height      2 inch above chair height with staggered stance L behind R         1            1         10            10 10/18, ^10/21 chair height, ^10/31 Pt was able to perform second set WITHOUT UE A; ^11/6 staggered stance   Leg Press (DL)         100#    110#    120#    130#    140#         1    1    1  1    1    1         15    15    10  10    10    10 10/21, ^10/25 wt. ^10/28 reps/wt, ^11/6 wt, ^11/8 wt/reps, ^11/15 wt/reps   Leg Press (single leg)   40#  45#    50#  60#  65#    70#    80#    90#    100#   1 L   1 L    1 L  1 L  1L     1 R    1    1    1   10 L  15 L    10 L  10L  10 L    15 R    15 R    10 R    10 R 10/21, ^10/25 wt, ^10/28 wt on R, dec wt tolerance on L, ^10/31 wt R/L, ^11/6 wt L LE, ^11/8 wt/sets, ^11/15 wt, ^11/20 WT/set/reps                         Manual Intervention (62283)  TIME                                        NMR re-education (74626)

## 2024-11-21 ENCOUNTER — PATIENT MESSAGE (OUTPATIENT)
Dept: CARDIOLOGY CLINIC | Age: 74
End: 2024-11-21

## 2024-11-25 ENCOUNTER — HOSPITAL ENCOUNTER (OUTPATIENT)
Dept: PHYSICAL THERAPY | Age: 74
Setting detail: THERAPIES SERIES
Discharge: HOME | End: 2024-11-25
Payer: MEDICARE

## 2024-11-25 PROCEDURE — 97112 NEUROMUSCULAR REEDUCATION: CPT

## 2024-11-25 PROCEDURE — 97110 THERAPEUTIC EXERCISES: CPT

## 2024-11-25 NOTE — FLOWSHEET NOTE
[facility] - Outpatient Rehabilitation and Therapy [address], [place] [address] office: [phone] fax: [phone]         Physical Therapy: TREATMENT/PROGRESS NOTE   Patient: Randy Sanchez (74 y.o. male)   Examination Date: 2024   :  1950 MRN: [id number]   Visit #:  (10/7 - )  Insurance Allowable Auth Needed   BMN [x]Yes - Carelon    []No    Insurance: Payor: [facility] MEDICARE / Plan: [employer] DUAL ADVANTAGE / Product Type: *No Product type* /   Insurance ID: [id number] - (Medicare Managed)  Secondary Insurance (if applicable):    Treatment Diagnosis: -    ICD-10-CM    1. Difficulty walking  R26.2       2. Weakness of left lower extremity  R29.898          Medical Diagnosis:  Z87.81 (ICD-10-CM) - S/p left hip fracture    Referring Physician: Page Randolph MD  PCP: Colby Stevens MD     Plan of care signed (Y/N):     Date of Patient follow up with Physician:      Plan of Care Report: NO  POC update due: (10 visits /OR AUTH LIMITS, whichever is less) - 2024                                             Medical History:  Comorbidities:  Other: A-fib, HTN, Hx of mild CP  Relevant Medical History: Mild CP                                         Precautions/ Contra-indications:           Latex allergy:  NO  Pacemaker:    NO  Contraindications for Manipulation: None  Date of Surgery: 24  Other:    Red Flags:  None    Suicide Screening:   The patient did not verbalize a primary behavioral concern, suicidal ideation, suicidal intent, or demonstrate suicidal behaviors.    Preferred Language for Healthcare:   [x] English       [] other:    SUBJECTIVE EXAMINATION     Patient stated complaint: Pt reports he has been doing more and more at home independently, including walking with his walking stick at home which is going well and he feels more balanced and controlled with walking.        Test used Initial score  10/7/24 11/15 POC 2024   Pain Summary

## 2024-11-27 ENCOUNTER — APPOINTMENT (OUTPATIENT)
Dept: PHYSICAL THERAPY | Age: 74
End: 2024-11-27
Payer: MEDICARE

## 2024-12-03 ENCOUNTER — HOSPITAL ENCOUNTER (OUTPATIENT)
Dept: PHYSICAL THERAPY | Age: 74
Setting detail: THERAPIES SERIES
Discharge: HOME | End: 2024-12-03
Payer: MEDICARE

## 2024-12-03 PROCEDURE — 97112 NEUROMUSCULAR REEDUCATION: CPT

## 2024-12-03 PROCEDURE — 97110 THERAPEUTIC EXERCISES: CPT

## 2024-12-03 NOTE — FLOWSHEET NOTE
[facility] - Outpatient Rehabilitation and Therapy [address], [place] [address] office: [phone] fax: [phone]         Physical Therapy: TREATMENT/PROGRESS NOTE   Patient: Randy Sanchez (74 y.o. male)   Examination Date: 2024   :  1950 MRN: [id number]   Visit #:  ( - )  Insurance Allowable Auth Needed   BMN [x]Yes - Carelon    []No    Insurance: Payor: [facility] MEDICARE / Plan: ANTH [employer] [place] MEDICARE / Product Type: *No Product type* /   Insurance ID: [id number] - (Medicare Managed)  Secondary Insurance (if applicable):    Treatment Diagnosis: -    ICD-10-CM    1. Difficulty walking  R26.2       2. Weakness of left lower extremity  R29.898          Medical Diagnosis:  Z87.81 (ICD-10-CM) - S/p left hip fracture    Referring Physician: Page Randolph MD  PCP: Colby Stevens MD     Plan of care signed (Y/N):     Date of Patient follow up with Physician:      Plan of Care Report: NO  POC update due: (10 visits /OR AUTH LIMITS, whichever is less) - 2024                                             Medical History:  Comorbidities:  Other: A-fib, HTN, Hx of mild CP  Relevant Medical History: Mild CP                                         Precautions/ Contra-indications:           Latex allergy:  NO  Pacemaker:    NO  Contraindications for Manipulation: None  Date of Surgery: 24  Other:    Red Flags:  None    Suicide Screening:   The patient did not verbalize a primary behavioral concern, suicidal ideation, suicidal intent, or demonstrate suicidal behaviors.    Preferred Language for Healthcare:   [x] English       [] other:    SUBJECTIVE EXAMINATION     Patient stated complaint: Pt states he continues to do well and notes progress. Now at home he is exclusively using his walking stick and a SPC to get around at home and is doing so efficiently and no longer using his RW.        Test used Initial score  10/7/24 11/15 POC 2024   Pain

## 2024-12-05 ENCOUNTER — HOSPITAL ENCOUNTER (OUTPATIENT)
Dept: PHYSICAL THERAPY | Age: 74
Setting detail: THERAPIES SERIES
Discharge: HOME | End: 2024-12-05
Payer: MEDICARE

## 2024-12-05 ENCOUNTER — TELEPHONE (OUTPATIENT)
Dept: INTERNAL MEDICINE CLINIC | Age: 74
End: 2024-12-05

## 2024-12-05 PROCEDURE — 97110 THERAPEUTIC EXERCISES: CPT

## 2024-12-05 PROCEDURE — 97112 NEUROMUSCULAR REEDUCATION: CPT

## 2024-12-05 NOTE — FLOWSHEET NOTE
Functional goals/ Treatment Progress Update:  [] Patient is progressing as expected towards functional goals listed.    [] Progression is slowed due to complexities/Impairments listed.  [] Progression has been slowed due to co-morbidities.  [x] Plan just implemented, too soon (<30days) to assess goals progression   [] Goals require adjustment due to lack of progress  [] Patient is not progressing as expected and requires additional follow up with physician  [] Other:     TREATMENT PLAN     Frequency/Duration: 1-2x/week for 8 weeks for the following treatment interventions:    Interventions:  Therapeutic Exercise (41238) including: strength training, ROM, and functional mobility  Therapeutic Activities (49566) including: functional mobility training and education.  Neuromuscular Re-education (47081) activation and proprioception, including postural re-education.    Manual Therapy (48411) as indicated to include: Passive Range of Motion and Gr I-IV mobilizations  Modalities as needed that may include: Cryotherapy and Vasoneumatic Compression  Patient education on postural re-education, activity modification, and progression of HEP    Plan: Continue with LE strength and stepping strategies for balance and standing tolerance.     Electronically Signed by Robbin Parker, PT, DPT, OCS, OMT-C Date: 12/05/2024     Note: Portions of this note have been templated and/or copied from initial evaluation, reassessments and prior notes for documentation efficiency.    Note: If patient does not return for scheduled/recommended follow up visits, this note will serve as a discharge from care along with the most recent update on progress.

## 2024-12-05 NOTE — TELEPHONE ENCOUNTER
Cardiac Clearance request for /colon cancer screening:my chart sent to patient with link to schedule for assessment.

## 2024-12-09 ENCOUNTER — TELEPHONE (OUTPATIENT)
Dept: INTERNAL MEDICINE CLINIC | Age: 74
End: 2024-12-09

## 2024-12-09 NOTE — TELEPHONE ENCOUNTER
[phone] [facility] needs to know how long patient will need to hold eliquis prior to colonoscopy? 12/11/24.

## 2024-12-09 NOTE — TELEPHONE ENCOUNTER
Ladan with [employer] calling. States they faxed over clearance for for pt, has colonoscopy 12/11. Asks to please fax form back as soon as possible.

## 2024-12-11 ENCOUNTER — ANESTHESIA EVENT (OUTPATIENT)
Dept: ENDOSCOPY | Age: 74
End: 2024-12-11
Payer: MEDICARE

## 2024-12-11 ENCOUNTER — HOSPITAL ENCOUNTER (OUTPATIENT)
Age: 74
Setting detail: OUTPATIENT SURGERY
Discharge: HOME | End: 2024-12-11
Attending: INTERNAL MEDICINE | Admitting: INTERNAL MEDICINE
Payer: MEDICARE

## 2024-12-11 ENCOUNTER — ANESTHESIA (OUTPATIENT)
Dept: ENDOSCOPY | Age: 74
End: 2024-12-11
Payer: MEDICARE

## 2024-12-11 VITALS
OXYGEN SATURATION: 95 % | HEART RATE: 80 BPM | SYSTOLIC BLOOD PRESSURE: 108 MMHG | WEIGHT: 225 LBS | BODY MASS INDEX: 32.28 KG/M2 | DIASTOLIC BLOOD PRESSURE: 66 MMHG | TEMPERATURE: 97.1 F | RESPIRATION RATE: 16 BRPM

## 2024-12-11 PROCEDURE — 6360000002 HC RX W HCPCS: Performed by: NURSE ANESTHETIST, CERTIFIED REGISTERED

## 2024-12-11 PROCEDURE — 3700000001 HC ADD 15 MINUTES (ANESTHESIA): Performed by: INTERNAL MEDICINE

## 2024-12-11 PROCEDURE — 3609027000 HC COLONOSCOPY: Performed by: INTERNAL MEDICINE

## 2024-12-11 PROCEDURE — 7100000011 HC PHASE II RECOVERY - ADDTL 15 MIN: Performed by: INTERNAL MEDICINE

## 2024-12-11 PROCEDURE — 7100000010 HC PHASE II RECOVERY - FIRST 15 MIN: Performed by: INTERNAL MEDICINE

## 2024-12-11 PROCEDURE — 3700000000 HC ANESTHESIA ATTENDED CARE: Performed by: INTERNAL MEDICINE

## 2024-12-11 PROCEDURE — 2709999900 HC NON-CHARGEABLE SUPPLY: Performed by: INTERNAL MEDICINE

## 2024-12-11 RX ORDER — OXYCODONE HYDROCHLORIDE 5 MG/1
10 TABLET ORAL PRN
Status: CANCELLED | OUTPATIENT
Start: 2024-12-11 | End: 2024-12-11

## 2024-12-11 RX ORDER — SODIUM CHLORIDE 0.9 % (FLUSH) 0.9 %
5-40 SYRINGE (ML) INJECTION PRN
Status: CANCELLED | OUTPATIENT
Start: 2024-12-11

## 2024-12-11 RX ORDER — ONDANSETRON 2 MG/ML
4 INJECTION INTRAMUSCULAR; INTRAVENOUS
Status: CANCELLED | OUTPATIENT
Start: 2024-12-11 | End: 2024-12-12

## 2024-12-11 RX ORDER — NALOXONE HYDROCHLORIDE 0.4 MG/ML
INJECTION, SOLUTION INTRAMUSCULAR; INTRAVENOUS; SUBCUTANEOUS PRN
Status: CANCELLED | OUTPATIENT
Start: 2024-12-11

## 2024-12-11 RX ORDER — PROPOFOL 10 MG/ML
INJECTION, EMULSION INTRAVENOUS
Status: DISCONTINUED | OUTPATIENT
Start: 2024-12-11 | End: 2024-12-11 | Stop reason: SDUPTHER

## 2024-12-11 RX ORDER — OXYCODONE HYDROCHLORIDE 5 MG/1
5 TABLET ORAL PRN
Status: CANCELLED | OUTPATIENT
Start: 2024-12-11 | End: 2024-12-11

## 2024-12-11 RX ORDER — DIPHENHYDRAMINE HYDROCHLORIDE 50 MG/ML
12.5 INJECTION INTRAMUSCULAR; INTRAVENOUS
Status: CANCELLED | OUTPATIENT
Start: 2024-12-11 | End: 2024-12-12

## 2024-12-11 RX ORDER — LABETALOL HYDROCHLORIDE 5 MG/ML
5 INJECTION, SOLUTION INTRAVENOUS EVERY 10 MIN PRN
Status: CANCELLED | OUTPATIENT
Start: 2024-12-11

## 2024-12-11 RX ORDER — FENTANYL CITRATE 50 UG/ML
25 INJECTION, SOLUTION INTRAMUSCULAR; INTRAVENOUS EVERY 5 MIN PRN
Status: CANCELLED | OUTPATIENT
Start: 2024-12-11

## 2024-12-11 RX ORDER — PROCHLORPERAZINE EDISYLATE 5 MG/ML
5 INJECTION INTRAMUSCULAR; INTRAVENOUS
Status: CANCELLED | OUTPATIENT
Start: 2024-12-11 | End: 2024-12-12

## 2024-12-11 RX ORDER — SODIUM CHLORIDE 0.9 % (FLUSH) 0.9 %
5-40 SYRINGE (ML) INJECTION EVERY 12 HOURS SCHEDULED
Status: CANCELLED | OUTPATIENT
Start: 2024-12-11

## 2024-12-11 RX ORDER — LIDOCAINE HYDROCHLORIDE 20 MG/ML
INJECTION, SOLUTION EPIDURAL; INFILTRATION; INTRACAUDAL; PERINEURAL
Status: DISCONTINUED | OUTPATIENT
Start: 2024-12-11 | End: 2024-12-11 | Stop reason: SDUPTHER

## 2024-12-11 RX ORDER — SODIUM CHLORIDE 9 MG/ML
INJECTION, SOLUTION INTRAVENOUS PRN
Status: CANCELLED | OUTPATIENT
Start: 2024-12-11

## 2024-12-11 RX ADMIN — LIDOCAINE HYDROCHLORIDE 50 MG: 20 INJECTION, SOLUTION EPIDURAL; INFILTRATION; INTRACAUDAL; PERINEURAL at 09:24

## 2024-12-11 RX ADMIN — PROPOFOL 100 MG: 10 INJECTION, EMULSION INTRAVENOUS at 09:24

## 2024-12-11 RX ADMIN — PROPOFOL 120 MCG/KG/MIN: 10 INJECTION, EMULSION INTRAVENOUS at 09:25

## 2024-12-11 ASSESSMENT — PAIN - FUNCTIONAL ASSESSMENT: PAIN_FUNCTIONAL_ASSESSMENT: 0-10

## 2024-12-11 NOTE — ANESTHESIA POSTPROCEDURE EVALUATION
Department of Anesthesiology  Postprocedure Note    Patient: Randy Sanchez  MRN: [id number]  YOB: 1950  Date of evaluation: 12/11/2024    Procedure Summary       Date: 12/11/24 Room / Location: [place] [address] / [facility]    Anesthesia Start: 0922 Anesthesia Stop: 0953    Procedure: COLONOSCOPY DIAGNOSTIC Diagnosis:       Colon cancer screening      (Colon cancer screening [Z12.11])    Surgeons: Frankie Nicole MD Responsible Provider: Ra Sommers DO    Anesthesia Type: MAC ASA Status: 3            Anesthesia Type: No value filed.    Ruben Phase I: Ruben Score: 10    Ruben Phase II: Ruben Score: 10    Anesthesia Post Evaluation    Patient location during evaluation: PACU  Patient participation: complete - patient participated  Level of consciousness: awake  Airway patency: patent  Nausea & Vomiting: no nausea  Cardiovascular status: hemodynamically stable  Respiratory status: acceptable  Hydration status: euvolemic  Multimodal analgesia pain management approach  Pain management: adequate    No notable events documented.

## 2024-12-11 NOTE — PROGRESS NOTES
Procedure completed as routine. Bedside handoff given to PACU RN.  Abdominal pressure given per MD verbal order, skin intact    
Pt arrived from procedure room to recovery bay 7. Report received from endo staff and CRNA. Pt is arousable to voice. Pt on RA,  and VSS. pt meets criteria for Phase II. Will continue with care plan.   
Teaching / education initiated regarding perioperative experience, expectations, and pain management during stay. Patient verbalized understanding.  
the nurse. All policies are subject to change.

## 2024-12-11 NOTE — ANESTHESIA POSTPROCEDURE EVALUATION
Department of Anesthesiology  Postprocedure Note    Patient: Randy Sanchez  MRN: [id number]  YOB: 1950  Date of evaluation: 12/11/2024    Procedure Summary       Date: 12/11/24 Room / Location: [place] [address] / [facility]    Anesthesia Start: 0922 Anesthesia Stop: 0953    Procedure: COLONOSCOPY DIAGNOSTIC Diagnosis:       Colon cancer screening      (Colon cancer screening [Z12.11])    Surgeons: Frankie Nicole MD Responsible Provider: Ra Sommers DO    Anesthesia Type: MAC ASA Status: 3            Anesthesia Type: No value filed.    Ruben Phase I: Ruben Score: 10    Ruben Phase II: Ruben Score: 10    Anesthesia Post Evaluation    Patient location during evaluation: PACU  Patient participation: complete - patient participated  Level of consciousness: awake  Airway patency: patent  Nausea & Vomiting: no nausea  Cardiovascular status: hemodynamically stable  Respiratory status: acceptable  Hydration status: euvolemic  Multimodal analgesia pain management approach  Pain management: adequate    No notable events documented.

## 2024-12-11 NOTE — H&P
Gastroenterology Note                 Pre-operative History and Physical    Patient: Randy Sanchez  : 1950  CSN:     History Obtained From:   Patient or guardian.      HISTORY OF PRESENT ILLNESS:    The patient is a 74 y.o. male here for Endoscopy.      Past Medical History:    Past Medical History:   Diagnosis Date    Atrial fibrillation (HCC)     Broken femur (HCC) 2024    Permanent atrial fibrillation (HCC) 10/06/2021    Postural urinary incontinence 10/06/2021    Status post prostatectomy patient has a device that is controlling it    Primary hypertension 10/06/2021    Prostate cancer (HCC) 10/06/2021     Past Surgical History:    Past Surgical History:   Procedure Laterality Date    CT CRYO ABLATION RENAL TUMOR  12/15/2022    CT CRYO ABLATION RENAL TUMOR 12/15/2022 [facility] CT SCAN    FEMUR SURGERY  2024    JOINT REPLACEMENT  2019    PROSTATE SURGERY  2015    TONSILLECTOMY      UMBILICAL HERNIA REPAIR       Medications Prior to Admission:   No current facility-administered medications on file prior to encounter.     Current Outpatient Medications on File Prior to Encounter   Medication Sig Dispense Refill    carvedilol (COREG) 12.5 MG tablet Take 1 tablet by mouth with breakfast and with evening meal 90 tablet 3    acetaminophen (TYLENOL) 500 MG tablet Take 2 tablets by mouth 2 times daily      tolterodine (DETROL LA) 4 MG extended release capsule TAKE 1 CAPSULE DAILY 90 capsule 1    apixaban (ELIQUIS) 5 MG TABS tablet TAKE 1 TABLET TWICE A  tablet 3    atorvastatin (LIPITOR) 20 MG tablet Take 1 tablet by mouth nightly 90 tablet 3    potassium chloride (KLOR-CON M20) 20 MEQ extended release tablet Take 1 tablet by mouth 2 times daily 180 tablet 3    vitamin D 25 MCG (1000 UT) CAPS Take 1 capsule by mouth daily      coenzyme Q10 200 MG CAPS capsule Take 1 capsule by mouth daily      cyanocobalamin 500 MCG tablet Take 2 tablets by mouth daily          Allergies:  
NEGATIVE

## 2024-12-11 NOTE — ANESTHESIA PRE PROCEDURE
results found for: \"HIV\", \"HEPCAB\"    COVID-19 Screening (If Applicable): No results found for: \"COVID19\"        Anesthesia Evaluation  Patient summary reviewed   no history of anesthetic complications:   Airway: Mallampati: II  TM distance: >3 FB   Neck ROM: limited  Mouth opening: > = 3 FB   Dental: normal exam         Pulmonary:Negative Pulmonary ROS and normal exam                               Cardiovascular:  Exercise tolerance: good (>4 METS)  (+) dysrhythmias: atrial fibrillation                  Neuro/Psych:   Negative Neuro/Psych ROS              GI/Hepatic/Renal:   (+) bowel prep          Endo/Other: Negative Endo/Other ROS                    Abdominal: normal exam            Vascular: negative vascular ROS.         Other Findings: NPO MN  GA in past NAC  Labs acceptable  Greater than 4 METS  ECG reviewed  AF history, off of eliquis 5 days      Anesthesia Plan      MAC     ASA 3       Induction: intravenous.    MIPS: Postoperative opioids intended.  Anesthetic plan and risks discussed with patient.      Plan discussed with CRNA.    Attending anesthesiologist reviewed and agrees with Preprocedure content            Ra Sommers DO   12/11/2024

## 2024-12-19 ENCOUNTER — HOSPITAL ENCOUNTER (OUTPATIENT)
Dept: PHYSICAL THERAPY | Age: 74
Setting detail: THERAPIES SERIES
Discharge: HOME | End: 2024-12-19
Payer: MEDICARE

## 2024-12-19 PROCEDURE — 97110 THERAPEUTIC EXERCISES: CPT

## 2024-12-19 PROCEDURE — 97112 NEUROMUSCULAR REEDUCATION: CPT

## 2024-12-19 NOTE — FLOWSHEET NOTE
surgery.   (59234) NEUROMUSCULAR RE-EDUCATION - Therapeutic procedure, 1 or more areas, each 15 minutes; neuromuscular reeducation of movement, balance, coordination, kinesthetic sense, posture, and/or proprioception for sitting and/or standing activities    GOALS     Patient stated goal: improve strength to fully regain independence and walk with his walking stick   [] Progressing: [] Met: [] Not Met: [] Adjusted    Therapist goals for Patient:   Short Term Goals: To be achieved in: 2 weeks  1. Independent in HEP and progression per patient tolerance, in order to prevent re-injury.   [x] Progressing: [] Met: [] Not Met: [] Adjusted  2. Patient will have a decrease in pain to <0/10 to facilitate improvement in movement, function, and ADLs as indicated by Functional Deficits.  [] Progressing: [x] Met: [] Not Met: [] Adjusted    Long Term Goals: To be achieved in: 8 weeks  1. Disability index score of 6% or less for the WOMAC to assist with reaching prior level of function with activities such as lifting and carrying laundry basket and taking out trash.  [] Progressing: [x] Met: [] Not Met: [] Adjusted  2. Patient will demonstrate increased AROM of L hip flexion to 110 without pain to allow for proper joint functioning to enable patient to don/doff shoes and socks on L.   [] Progressing: [] Met: [x] Not Met: [] Adjusted  3. Patient will demonstrate increased Strength of L quad to within 5lb with HHD of contralateral limb to allow for proper functional mobility to enable patient to return to sit to stand with NO UE A.   [] Progressing: [] Met: [x] Not Met: [] Adjusted  4. Patient will return to walking with his walking stick only without increased symptoms or restriction.   [] Progressing: [] Met: [x] Not Met: [] Adjusted  5. Patient will be able to ambulate community distances with LRAD and no pain.     [] Progressing: [] Met: [x] Not Met: [] Adjusted     Overall Progression Towards Functional goals/ Treatment

## 2025-01-03 ENCOUNTER — HOSPITAL ENCOUNTER (OUTPATIENT)
Dept: PHYSICAL THERAPY | Age: 75
Setting detail: THERAPIES SERIES
Discharge: HOME | End: 2025-01-03
Payer: MEDICARE

## 2025-01-03 PROCEDURE — 97530 THERAPEUTIC ACTIVITIES: CPT

## 2025-01-03 PROCEDURE — 97110 THERAPEUTIC EXERCISES: CPT

## 2025-01-03 PROCEDURE — 97112 NEUROMUSCULAR REEDUCATION: CPT

## 2025-01-03 NOTE — FLOWSHEET NOTE
Total Timed Code Tx Minutes 40 3       Total Treatment Minutes 40        Charge Justification:  (59406) THERAPEUTIC EXERCISE - Provided verbal/tactile cueing for activities related to strengthening, flexibility, endurance, ROM performed to prevent loss of range of motion, maintain or improve muscular strength or increase flexibility, following either an injury or surgery.   (71516) NEUROMUSCULAR RE-EDUCATION - Therapeutic procedure, 1 or more areas, each 15 minutes; neuromuscular reeducation of movement, balance, coordination, kinesthetic sense, posture, and/or proprioception for sitting and/or standing activities  (47934) THERAPEUTIC ACTIVITY - use of dynamic activities to improve functional performance. (Ex include squatting, ascending/descending stairs, walking, bending, lifting, catching, throwing, pushing, pulling, jumping.)  Direct, one on one contact, billed in 15-minute increments.    GOALS     Patient stated goal: improve strength to fully regain independence and walk with his walking stick   [] Progressing: [] Met: [] Not Met: [] Adjusted    Therapist goals for Patient:   Short Term Goals: To be achieved in: 2 weeks  1. Independent in HEP and progression per patient tolerance, in order to prevent re-injury.   [x] Progressing: [] Met: [] Not Met: [] Adjusted  2. Patient will have a decrease in pain to <0/10 to facilitate improvement in movement, function, and ADLs as indicated by Functional Deficits.  [] Progressing: [x] Met: [] Not Met: [] Adjusted    Long Term Goals: To be achieved in: 8 weeks  1. Disability index score of 6% or less for the WOMAC to assist with reaching prior level of function with activities such as lifting and carrying laundry basket and taking out trash.  [] Progressing: [x] Met: [] Not Met: [] Adjusted  2. Patient will demonstrate increased AROM of L hip flexion to 110 without pain to allow for proper joint functioning to enable patient to don/doff shoes and socks on L.   []

## 2025-01-07 ENCOUNTER — APPOINTMENT (OUTPATIENT)
Dept: PHYSICAL THERAPY | Age: 75
End: 2025-01-07
Payer: MEDICARE

## 2025-01-09 ENCOUNTER — HOSPITAL ENCOUNTER (OUTPATIENT)
Dept: PHYSICAL THERAPY | Age: 75
Setting detail: THERAPIES SERIES
Discharge: HOME | End: 2025-01-09
Payer: MEDICARE

## 2025-01-09 PROCEDURE — 97530 THERAPEUTIC ACTIVITIES: CPT

## 2025-01-09 PROCEDURE — 97110 THERAPEUTIC EXERCISES: CPT

## 2025-01-09 NOTE — FLOWSHEET NOTE
[facility] - Outpatient Rehabilitation and Therapy [address], [place] [address] office: [phone] fax: [phone]         Physical Therapy: TREATMENT/PROGRESS NOTE   Patient: Randy Sanchez (74 y.o. male)   Examination Date: 2025   :  1950 MRN: [id number]   Visit #:  (-)  Insurance Allowable Auth Needed   BMN [x]Yes - [employer]    []No    Insurance: Payor: [facility] MEDICARE / Plan: [facility] [place] MEDICARE / Product Type: *No Product type* /   Insurance ID: [id number] - (Medicare Managed)  Secondary Insurance (if applicable):    Treatment Diagnosis: -    ICD-10-CM    1. Difficulty walking  R26.2       2. Weakness of left lower extremity  R29.898          Medical Diagnosis:  Z87.81 (ICD-10-CM) - S/p left hip fracture    Referring Physician: Page Randolph MD  PCP: Colby Stevens MD     Plan of care signed (Y/N):     Date of Patient follow up with Physician:      Plan of Care Report: NO  POC update due: (10 visits /OR AUTH LIMITS, whichever is less) - 2024                                             Medical History:  Comorbidities:  Other: A-fib, HTN, Hx of mild CP  Relevant Medical History: Mild CP                                         Precautions/ Contra-indications:           Latex allergy:  NO  Pacemaker:    NO  Contraindications for Manipulation: None  Date of Surgery: 24  Other:    Red Flags:  None    Suicide Screening:   The patient did not verbalize a primary behavioral concern, suicidal ideation, suicidal intent, or demonstrate suicidal behaviors.    Preferred Language for Healthcare:   [x] English       [] other:    SUBJECTIVE EXAMINATION     Patient stated complaint: Pt reports he continues to note progress, doing more at home exclusively with his walking sticks and some with only 1 stick which is his goal as that was the case before his fall.        Test used Initial score  10/7/24 11/15 POC 2025   Pain Summary VAS 0-3 0

## 2025-01-14 ENCOUNTER — HOSPITAL ENCOUNTER (OUTPATIENT)
Dept: PHYSICAL THERAPY | Age: 75
Setting detail: THERAPIES SERIES
Discharge: HOME | End: 2025-01-14
Payer: MEDICARE

## 2025-01-14 PROCEDURE — 97530 THERAPEUTIC ACTIVITIES: CPT

## 2025-01-14 PROCEDURE — 97110 THERAPEUTIC EXERCISES: CPT

## 2025-01-14 NOTE — FLOWSHEET NOTE
taps with pods on floor        -UE/LE taps holding stick on R and table on L (UE pods on cones)    -UE/LE taps table in front UE taps on side on cones        -Sit to stand from chair with UE taps and return to sitting        - B UE ball taps  Blaze Pods with 1 LE on step in front and other on ground Blaze Pods on ground with LE taps only 3 pods / Random / 2 colors 40\" with 1\" delay x3 1/9                        Therapeutic Activity (91731)  Sets/time         Forward step ups 6 inch 2 5 R/L 2 HR on each side 1/3, ^1/9 step height   Lateral step ups 6 inch 1 10 R/L 1/3 B UE A, ^1/9 step height/reps                   Modalities:    No modalities applied this session    Education/Home Exercise Program: HEP instruction not indicated at this time. HEP from Home Health PT was reivewed with pt as well as additions of bridges, side stepping at counter and side step ups with 3 inch step at home. Pt declined need for handout. X10 min  10/7/24 Pt was educated on PT POC, Diagnosis, Prognosis, pathomechanics as well as frequency and duration of scheduling future physical therapy appointments. Time was also taken on this day to answer all patient questions and participation in PT. 5 min      ASSESSMENT     Today's Assessment: Pt with fair tolerance to PT today. Largely reviewed prior sessions loading parameters due to increased fatigue and difficulty maintaining prior loading program. Will continue to progress LE strength, activity tolerance and control needed for balance and community ambulation.     Medical Necessity Documentation:  I certify that this patient meets the below criteria necessary for medical necessity for care and/or justification of therapy services:  The patient has functional impairments and/or activity limitations and would benefit from continued outpatient therapy services to address the deficits outlined in the patients goals  The patient has a musculoskeletal condition(s) with a corresponding ICD-10 code

## 2025-01-16 ENCOUNTER — HOSPITAL ENCOUNTER (OUTPATIENT)
Dept: PHYSICAL THERAPY | Age: 75
Setting detail: THERAPIES SERIES
Discharge: HOME | End: 2025-01-16
Payer: MEDICARE

## 2025-01-16 PROCEDURE — 97110 THERAPEUTIC EXERCISES: CPT

## 2025-01-16 PROCEDURE — 97530 THERAPEUTIC ACTIVITIES: CPT

## 2025-01-16 NOTE — FLOWSHEET NOTE
and/or justification of therapy services:  The patient has functional impairments and/or activity limitations and would benefit from continued outpatient therapy services to address the deficits outlined in the patients goals  The patient has a musculoskeletal condition(s) with a corresponding ICD-10 code that is of complexity and severity that require skilled therapeutic intervention. This has a direct and significant impact on the need for therapy and significantly impacts the rate of recovery.   The patient has a complexity identified by an ICD-10 code that has a direct and significant impact on the need for therapy.  (Significantly impacts the rate of recovery and is associated with a primary condition.)     Return to Play: NA    Prognosis for POC: [x] Good [] Fair  [] Poor    Patient requires continued skilled intervention: [x] Yes  [] No      CHARGE CAPTURE     PT CHARGE GRID   CPT Code (TIMED) minutes # CPT Code (UNTIMED) #     Therex (36111)  20 2  EVAL:LOW (57565 - Typically 20 minutes face-to-face)     Neuromusc. Re-ed (44313)    Re-Eval (98160)     Manual (65312)    Estim Unattended (08299)     Ther. Act (78181) 18 1  Mech. Traction (28987)     Gait (59788)    Dry Needle 1-2 muscle (21581)     Aquatic Therex (40054)    Dry Needle 3+ muscle (20561)     Iontophoresis (70601)    VASO (82224)     Ultrasound (51175)    Group Therapy (00053)     Estim Attended (08040)    Canalith Repositioning (42591)     Physical Performance Test (89033)         Other:    Other:    Total Timed Code Tx Minutes 38 3       Total Treatment Minutes 38        Charge Justification:  (02660) THERAPEUTIC EXERCISE - Provided verbal/tactile cueing for activities related to strengthening, flexibility, endurance, ROM performed to prevent loss of range of motion, maintain or improve muscular strength or increase flexibility, following either an injury or surgery.   (60058) THERAPEUTIC ACTIVITY - use of dynamic activities to improve

## 2025-01-21 ENCOUNTER — HOSPITAL ENCOUNTER (OUTPATIENT)
Dept: PHYSICAL THERAPY | Age: 75
Setting detail: THERAPIES SERIES
Discharge: HOME | End: 2025-01-21
Payer: MEDICARE

## 2025-01-21 PROCEDURE — 97530 THERAPEUTIC ACTIVITIES: CPT

## 2025-01-21 PROCEDURE — 97110 THERAPEUTIC EXERCISES: CPT

## 2025-01-21 NOTE — FLOWSHEET NOTE
[facility] - Outpatient Rehabilitation and Therapy [address], [place] [address] office: [phone] fax: [phone]         Physical Therapy: TREATMENT/PROGRESS NOTE   Patient: Randy Sanchez (74 y.o. male)   Examination Date: 2025   :  1950 MRN: [id number]   Visit #:  (-)  Insurance Allowable Auth Needed   BMN [x]Yes - [place]    []No    Insurance: Payor: [facility] MEDICARE / Plan: [facility] [place] MEDICARE / Product Type: *No Product type* /   Insurance ID: [id number] - (Medicare Managed)  Secondary Insurance (if applicable):    Treatment Diagnosis: -    ICD-10-CM    1. Difficulty walking  R26.2       2. Weakness of left lower extremity  R29.898          Medical Diagnosis:  Z87.81 (ICD-10-CM) - S/p left hip fracture    Referring Physician: Page Randolph MD  PCP: Colby Stevens MD     Plan of care signed (Y/N):     Date of Patient follow up with Physician:      Plan of Care Report: NO  POC update due: (10 visits /OR AUTH LIMITS, whichever is less) - 2024 POC due NPV                                            Medical History:  Comorbidities:  Other: A-fib, HTN, Hx of mild CP  Relevant Medical History: Mild CP                                         Precautions/ Contra-indications:           Latex allergy:  NO  Pacemaker:    NO  Contraindications for Manipulation: None  Date of Surgery: 24  Other:    Red Flags:  None    Suicide Screening:   The patient did not verbalize a primary behavioral concern, suicidal ideation, suicidal intent, or demonstrate suicidal behaviors.    Preferred Language for Healthcare:   [x] English       [] other:    SUBJECTIVE EXAMINATION     Patient stated complaint: Pt states he continues to do well, practicing at home with his walking sticks and feeling more stable and controlled. Due to the weather, has been unable to practice with them outside of his house. Feels stronger.        Test used Initial score  10/7/24

## 2025-01-29 ENCOUNTER — TELEPHONE (OUTPATIENT)
Dept: INTERNAL MEDICINE CLINIC | Age: 75
End: 2025-01-29

## 2025-01-30 ENCOUNTER — HOSPITAL ENCOUNTER (OUTPATIENT)
Dept: PHYSICAL THERAPY | Age: 75
Setting detail: THERAPIES SERIES
Discharge: HOME | End: 2025-01-30
Payer: MEDICARE

## 2025-01-30 PROCEDURE — 97530 THERAPEUTIC ACTIVITIES: CPT

## 2025-01-30 PROCEDURE — 97110 THERAPEUTIC EXERCISES: CPT

## 2025-01-30 NOTE — PLAN OF CARE
[facility] - Outpatient Rehabilitation and Therapy [address], [place] [address] office: [phone] fax: [phone]     Physical Therapy Re-Certification Plan of Care    Dear Page Randolph MD  ,    We had the pleasure of treating the following patient for physical therapy services at [facility]. A summary of our findings can be found in the updated assessment below.  This includes our plan of care.  If you have any questions or concerns regarding these findings, please do not hesitate to contact me at the office phone number checked above.  Thank you for the referral.     Physician Signature:________________________________Date:__________________  By signing above (or electronic signature), therapist's plan is approved by physician      Total Visits: 22        Objective Measures:     Mvmt (norm) AROM L AROM R Notes PROM L PROM R Notes      LUMBAR Flex (90)              Ext (25)              SB (25)                Rotation (30)                     HIP Flex (120) 100 100            Abd (45)                ER (50)                IR (45)                Ext (20)               KNEE Flex (140) 120 120            Ext (0) 0 0              ANKLE DF (20)                PF (50)                Inversion (30)                Eversion (20)                             MMT L          MMT  R Notes      LUMBAR  Flexion         Extension         Lateral flexion           Rotation                MMT L MMT R Notes         HIP  Flexion 4 4+       Abduction 4- 4-       ER 4 4+       IR 4 4+       Extension         KNEE  Flexion 63.7# 54.8#       Extension 52.7# 62.7#        ANKLE  DF           PF           Inversion           Eversion            TU seconds     10 Meter Walk Test: 0.8 m/s          Overall Response to Treatment:  Pt demonstrates continued progress with LE strength and control. Pt has also progressed with improved TUG time and general gait speed with 10

## 2025-02-07 ENCOUNTER — HOSPITAL ENCOUNTER (OUTPATIENT)
Dept: PHYSICAL THERAPY | Age: 75
Setting detail: THERAPIES SERIES
Discharge: HOME | End: 2025-02-07
Payer: MEDICARE

## 2025-02-07 PROCEDURE — 97530 THERAPEUTIC ACTIVITIES: CPT

## 2025-02-07 PROCEDURE — 97110 THERAPEUTIC EXERCISES: CPT

## 2025-02-07 NOTE — FLOWSHEET NOTE
[facility] - Outpatient Rehabilitation and Therapy [address], [place] [address] office: [phone] fax: [phone]      Physical Therapy: TREATMENT/PROGRESS NOTE   Patient: Randy Sanchez (74 y.o. male)   Examination Date: 2025   :  1950 MRN: [id number]   Visit #:  (-)  Insurance Allowable Auth Needed   BMN [x]Yes - [place]    []No    Insurance: Payor: [facility]IC[facility] / [facility] [place] [facility] / Product Type: *No Product type* /   Insurance ID: [id number] - (Medicare Managed)  Secondary Insurance (if applicable):    Treatment Diagnosis: -    ICD-10-CM    1. Difficulty walking  R26.2       2. Weakness of left lower extremity  R29.898          Medical Diagnosis:  Z87.81 (ICD-10-CM) - S/p left hip fracture    Referring Physician: Page Randolph MD  PCP: Colby Stevens MD     Plan of care signed (Y/N):     Date of Patient follow up with Physician:      Plan of Care Report: NO  POC update due: (10 visits /OR AUTH LIMITS, whichever is less) - 2024                                             Medical History:  Comorbidities:  Other: A-fib, HTN, Hx of mild CP  Relevant Medical History: Mild CP                                         Precautions/ Contra-indications:           Latex allergy:  NO  Pacemaker:    NO  Contraindications for Manipulation: None  Date of Surgery: 24  Other:    Red Flags:  None    Suicide Screening:   The patient did not verbalize a primary behavioral concern, suicidal ideation, suicidal intent, or demonstrate suicidal behaviors.    Preferred Language for Healthcare:   [x] English       [] other:    SUBJECTIVE EXAMINATION     Patient stated complaint: Pt reports he continues to note weekly improvements. He is now able to get on/off his commode at home without UE A or significant difficulty. Still progressing with balance and standing tolerance with walking sticks.        Test used Initial score  10/7/24 11/15 POC POC

## 2025-02-14 ENCOUNTER — APPOINTMENT (OUTPATIENT)
Dept: PHYSICAL THERAPY | Age: 75
End: 2025-02-14
Payer: MEDICARE

## 2025-02-14 RX ORDER — CARVEDILOL 12.5 MG/1
TABLET ORAL
Qty: 90 TABLET | Refills: 1 | Status: SHIPPED | OUTPATIENT
Start: 2025-02-14

## 2025-02-21 ENCOUNTER — HOSPITAL ENCOUNTER (OUTPATIENT)
Dept: PHYSICAL THERAPY | Age: 75
Setting detail: THERAPIES SERIES
Discharge: HOME | End: 2025-02-21
Payer: MEDICARE

## 2025-02-21 PROCEDURE — 97530 THERAPEUTIC ACTIVITIES: CPT

## 2025-02-21 PROCEDURE — 97110 THERAPEUTIC EXERCISES: CPT

## 2025-02-21 NOTE — FLOWSHEET NOTE
[facility] - Outpatient Rehabilitation and Therapy [address], [place] [address] office: [phone] fax: [phone]      Physical Therapy: TREATMENT/PROGRESS NOTE   Patient: Randy Sanchez (75 y.o. male)   Examination Date: 2025   :  1950 MRN: [id number]   Visit #:  (-)  Insurance Allowable Auth Needed   BMN [x]Yes - [place]    []No    Insurance: Payor: [facility]IC[facility] [place] [facility] / Product Type: *No Product type* /   Insurance ID: [id number] - (Medicare Managed)  Secondary Insurance (if applicable):    Treatment Diagnosis: -    ICD-10-CM    1. Difficulty walking  R26.2       2. Weakness of left lower extremity  R29.898          Medical Diagnosis:  Z87.81 (ICD-10-CM) - S/p left hip fracture    Referring Physician: Page Randolph MD  PCP: Colby Stevens MD     Plan of care signed (Y/N):     Date of Patient follow up with Physician:      Plan of Care Report: NO  POC update due: (10 visits /OR AUTH LIMITS, whichever is less) - 2024                                             Medical History:  Comorbidities:  Other: A-fib, HTN, Hx of mild CP  Relevant Medical History: Mild CP                                         Precautions/ Contra-indications:           Latex allergy:  NO  Pacemaker:    NO  Contraindications for Manipulation: None  Date of Surgery: 24  Other:    Red Flags:  None    Suicide Screening:   The patient did not verbalize a primary behavioral concern, suicidal ideation, suicidal intent, or demonstrate suicidal behaviors.    Preferred Language for Healthcare:   [x] English       [] other:    SUBJECTIVE EXAMINATION     Patient stated complaint: Pt reports he continues to steadily progress, has been using his RW less around the house and getting to/from his car and feeling a lot stronger. Still using RW for longer distances and especially with colder weather.         Test used Initial score  10/7/24 11/15 POC

## 2025-02-28 ENCOUNTER — APPOINTMENT (OUTPATIENT)
Dept: PHYSICAL THERAPY | Age: 75
End: 2025-02-28
Payer: MEDICARE

## 2025-03-05 ENCOUNTER — HOSPITAL ENCOUNTER (OUTPATIENT)
Dept: PHYSICAL THERAPY | Age: 75
Setting detail: THERAPIES SERIES
Discharge: HOME | End: 2025-03-05
Payer: MEDICARE

## 2025-03-05 PROCEDURE — 97112 NEUROMUSCULAR REEDUCATION: CPT

## 2025-03-05 PROCEDURE — 97530 THERAPEUTIC ACTIVITIES: CPT

## 2025-03-05 NOTE — FLOWSHEET NOTE
[] Progressing: [] Met: [x] Not Met: [] Adjusted     Overall Progression Towards Functional goals/ Treatment Progress Update:  [] Patient is progressing as expected towards functional goals listed.    [x] Progression is slowed due to complexities/Impairments listed.  [] Progression has been slowed due to co-morbidities.  [] Plan just implemented, too soon (<30days) to assess goals progression   [] Goals require adjustment due to lack of progress  [] Patient is not progressing as expected and requires additional follow up with physician  [] Other:     TREATMENT PLAN     Frequency/Duration: 1-2x/week for 8 weeks for the following treatment interventions:    Interventions:  Therapeutic Exercise (56095) including: strength training, ROM, and functional mobility  Therapeutic Activities (05783) including: functional mobility training and education.  Neuromuscular Re-education (92826) activation and proprioception, including postural re-education.    Manual Therapy (97200) as indicated to include: Passive Range of Motion and Gr I-IV mobilizations  Modalities as needed that may include: Cryotherapy and Vasoneumatic Compression  Patient education on postural re-education, activity modification, and progression of HEP    Plan: Continue with LE strength and stepping strategies for balance and standing tolerance.     Electronically Signed by Robbin Parker, PT, DPT, OCS, OMT-C Date: 03/05/2025     Note: Portions of this note have been templated and/or copied from initial evaluation, reassessments and prior notes for documentation efficiency.    Note: If patient does not return for scheduled/recommended follow up visits, this note will serve as a discharge from care along with the most recent update on progress.

## 2025-03-07 ENCOUNTER — HOSPITAL ENCOUNTER (OUTPATIENT)
Dept: PHYSICAL THERAPY | Age: 75
Setting detail: THERAPIES SERIES
Discharge: HOME | End: 2025-03-07
Payer: MEDICARE

## 2025-03-07 PROCEDURE — 97530 THERAPEUTIC ACTIVITIES: CPT

## 2025-03-07 PROCEDURE — 97112 NEUROMUSCULAR REEDUCATION: CPT

## 2025-03-07 NOTE — FLOWSHEET NOTE
sitting        - B UE ball taps               -LE step taps with stick on L and plinth on R      -LE taps with stance on L; stick in L and plinth on R     Random 2 pods      Random 2 pods    FOCUS 3 pods; 1 distractor 1'        30\"      30\"   X2        X2      x3 Blaze Pods with 1 LE on step in front and other on ground Blaze Pods on ground with LE taps only                      Therapeutic Activity (53111)  Sets/time         Forward step ups 6 inch 2 10 R Deadlift with KB to 10 inch step 10#  15# 1  1 10 1/21, ^2/7 target for depth   Staggered stance dead lifting reaching across unilateral UE to step 7.5# KB ; 8 inch step    10# KB; 8 inch step 1      1 10      10 3/5                          Modalities:    No modalities applied this session    Education/Home Exercise Program: HEP instruction not indicated at this time. HEP from Home Health PT was reivewed with pt as well as additions of bridges, side stepping at counter and side step ups with 3 inch step at home. Pt declined need for handout. X10 min  10/7/24 Pt was educated on PT POC, Diagnosis, Prognosis, pathomechanics as well as frequency and duration of scheduling future physical therapy appointments. Time was also taken on this day to answer all patient questions and participation in PT. 5 min      ASSESSMENT     Today's Assessment: Pt did well with session today, largely reviewing prior sessions additions to continue to allow to acclimate from loading and new movement patterns. Pt did need increased time for Blaze Pod stepping strategy to remember stepping patterns and establish balance and control. Once established, pt did well with good control and balance, no PT intervention needed for control today as pt is progressing with standing stability. Will plan to f/u next session for reassessment and possible d/c to HEP.     Medical Necessity Documentation:  I certify that this patient meets the below criteria necessary for medical necessity for care and/or

## 2025-03-14 ENCOUNTER — HOSPITAL ENCOUNTER (OUTPATIENT)
Dept: PHYSICAL THERAPY | Age: 75
Setting detail: THERAPIES SERIES
Discharge: HOME | End: 2025-03-14
Payer: MEDICARE

## 2025-03-14 NOTE — DISCHARGE SUMMARY
in front and other on ground Blaze Pods on ground with LE taps only                      Therapeutic Activity (95973)  Sets/time     POC reassessment, objective data gathering and FOM  20 min  3/14   Forward step ups 6 inch 2 10 R Deadlift with KB to 10 inch step 10#  15# 1  1 10 1/21, ^2/7 target for depth   Staggered stance dead lifting reaching across unilateral UE to step 7.5# KB ; 8 inch step    10# KB; 8 inch step 1      1 10      10 3/5                          Modalities:    No modalities applied this session    Education/Home Exercise Program: HEP instruction not indicated at this time. HEP from Home Health PT was reivewed with pt as well as additions of bridges, side stepping at counter and side step ups with 3 inch step at home. Pt declined need for handout. X10 min  10/7/24 Pt was educated on PT POC, Diagnosis, Prognosis, pathomechanics as well as frequency and duration of scheduling future physical therapy appointments. Time was also taken on this day to answer all patient questions and participation in PT. 5 min    Access Code: [email or web address]  URL: [email or web address]  Date: 03/14/2025  Prepared by: Dotty Stoner    Exercises (in addition to previous HEP)  - Supine Active Straight Leg Raise  - 1 x daily - 7 x weekly - 3 sets - 10 reps  - Sidelying Hip Abduction  - 1 x daily - 7 x weekly - 3 sets - 10 reps  - Side Stepping with Resistance at Ankles and Counter Support  - 1 x daily - 7 x weekly - 3 sets - 10 reps    ASSESSMENT     Today's Assessment: Pt states that he is 100% back to PLOF at this point. He can do everything that he wants/needs to do with no limitations or pain. Upon objective re-assessment, pt demonstrates improved L hip ROM and strength, as well as improved functional outcome measure (WOMAC) score compared to last assessment. At this point, pt demonstrates mild strength and ROM limitations of the L hip. Considering these things, pt no longer requires continued PT services and is

## 2025-03-21 NOTE — TELEPHONE ENCOUNTER
Last OV: 4/10/2023  Next OV: Visit date not found    Next appointment due:10/10/23    Last fill:9/5/23  Refills:0 N/A

## 2025-03-24 ENCOUNTER — TRANSCRIBE ORDERS (OUTPATIENT)
Dept: ADMINISTRATIVE | Age: 75
End: 2025-03-24

## 2025-03-24 DIAGNOSIS — N28.89 OTHER SPECIFIED DISORDERS OF KIDNEY AND URETER: Primary | ICD-10-CM

## 2025-03-25 SDOH — HEALTH STABILITY: PHYSICAL HEALTH: ON AVERAGE, HOW MANY DAYS PER WEEK DO YOU ENGAGE IN MODERATE TO STRENUOUS EXERCISE (LIKE A BRISK WALK)?: 3 DAYS

## 2025-03-25 SDOH — HEALTH STABILITY: PHYSICAL HEALTH: ON AVERAGE, HOW MANY MINUTES DO YOU ENGAGE IN EXERCISE AT THIS LEVEL?: 70 MIN

## 2025-03-25 SDOH — ECONOMIC STABILITY: INCOME INSECURITY: IN THE LAST 12 MONTHS, WAS THERE A TIME WHEN YOU WERE NOT ABLE TO PAY THE MORTGAGE OR RENT ON TIME?: NO

## 2025-03-25 SDOH — ECONOMIC STABILITY: FOOD INSECURITY: WITHIN THE PAST 12 MONTHS, THE FOOD YOU BOUGHT JUST DIDN'T LAST AND YOU DIDN'T HAVE MONEY TO GET MORE.: NEVER TRUE

## 2025-03-25 SDOH — ECONOMIC STABILITY: FOOD INSECURITY: WITHIN THE PAST 12 MONTHS, YOU WORRIED THAT YOUR FOOD WOULD RUN OUT BEFORE YOU GOT MONEY TO BUY MORE.: NEVER TRUE

## 2025-03-25 SDOH — ECONOMIC STABILITY: TRANSPORTATION INSECURITY
IN THE PAST 12 MONTHS, HAS LACK OF TRANSPORTATION KEPT YOU FROM MEETINGS, WORK, OR FROM GETTING THINGS NEEDED FOR DAILY LIVING?: NO

## 2025-03-25 SDOH — ECONOMIC STABILITY: TRANSPORTATION INSECURITY
IN THE PAST 12 MONTHS, HAS THE LACK OF TRANSPORTATION KEPT YOU FROM MEDICAL APPOINTMENTS OR FROM GETTING MEDICATIONS?: NO

## 2025-03-25 ASSESSMENT — PATIENT HEALTH QUESTIONNAIRE - PHQ9
2. FEELING DOWN, DEPRESSED OR HOPELESS: NOT AT ALL
SUM OF ALL RESPONSES TO PHQ QUESTIONS 1-9: 0
1. LITTLE INTEREST OR PLEASURE IN DOING THINGS: NOT AT ALL

## 2025-03-25 ASSESSMENT — LIFESTYLE VARIABLES
HOW OFTEN DO YOU HAVE A DRINK CONTAINING ALCOHOL: 1
HOW MANY STANDARD DRINKS CONTAINING ALCOHOL DO YOU HAVE ON A TYPICAL DAY: 0
HOW OFTEN DO YOU HAVE SIX OR MORE DRINKS ON ONE OCCASION: 1
HOW OFTEN DO YOU HAVE A DRINK CONTAINING ALCOHOL: NEVER
HOW MANY STANDARD DRINKS CONTAINING ALCOHOL DO YOU HAVE ON A TYPICAL DAY: PATIENT DOES NOT DRINK

## 2025-03-28 ENCOUNTER — OFFICE VISIT (OUTPATIENT)
Dept: INTERNAL MEDICINE CLINIC | Age: 75
End: 2025-03-28

## 2025-03-28 VITALS
HEART RATE: 64 BPM | SYSTOLIC BLOOD PRESSURE: 126 MMHG | WEIGHT: 224 LBS | DIASTOLIC BLOOD PRESSURE: 82 MMHG | BODY MASS INDEX: 32.07 KG/M2 | HEIGHT: 70 IN | OXYGEN SATURATION: 97 %

## 2025-03-28 DIAGNOSIS — N39.46 MIXED STRESS AND URGE URINARY INCONTINENCE: ICD-10-CM

## 2025-03-28 DIAGNOSIS — R73.9 HYPERGLYCEMIA: ICD-10-CM

## 2025-03-28 DIAGNOSIS — N18.30 STAGE 3 CHRONIC KIDNEY DISEASE, UNSPECIFIED WHETHER STAGE 3A OR 3B CKD (HCC): ICD-10-CM

## 2025-03-28 DIAGNOSIS — Z13.220 SCREENING FOR CHOLESTEROL LEVEL: ICD-10-CM

## 2025-03-28 DIAGNOSIS — D50.8 IRON DEFICIENCY ANEMIA SECONDARY TO INADEQUATE DIETARY IRON INTAKE: ICD-10-CM

## 2025-03-28 DIAGNOSIS — C61 PROSTATE CANCER (HCC): ICD-10-CM

## 2025-03-28 DIAGNOSIS — I48.0 PAROXYSMAL ATRIAL FIBRILLATION (HCC): ICD-10-CM

## 2025-03-28 DIAGNOSIS — Z00.00 MEDICARE ANNUAL WELLNESS VISIT, SUBSEQUENT: ICD-10-CM

## 2025-03-28 DIAGNOSIS — Z00.00 MEDICARE ANNUAL WELLNESS VISIT, SUBSEQUENT: Primary | ICD-10-CM

## 2025-03-28 LAB
ALBUMIN SERPL-MCNC: 4.7 G/DL (ref 3.4–5)
ALBUMIN/GLOB SERPL: 1.8 {RATIO} (ref 1.1–2.2)
ALP SERPL-CCNC: 111 U/L (ref 40–129)
ALT SERPL-CCNC: 25 U/L (ref 10–40)
ANION GAP SERPL CALCULATED.3IONS-SCNC: 13 MMOL/L (ref 3–16)
AST SERPL-CCNC: 21 U/L (ref 15–37)
BASOPHILS # BLD: 0.1 K/UL (ref 0–0.2)
BASOPHILS NFR BLD: 1.2 %
BILIRUB SERPL-MCNC: 0.9 MG/DL (ref 0–1)
BUN SERPL-MCNC: 16 MG/DL (ref 7–20)
CALCIUM SERPL-MCNC: 9.8 MG/DL (ref 8.3–10.6)
CHLORIDE SERPL-SCNC: 104 MMOL/L (ref 99–110)
CHOLEST SERPL-MCNC: 140 MG/DL (ref 0–199)
CO2 SERPL-SCNC: 23 MMOL/L (ref 21–32)
CREAT SERPL-MCNC: 1.2 MG/DL (ref 0.8–1.3)
DEPRECATED RDW RBC AUTO: 14.8 % (ref 12.4–15.4)
EOSINOPHIL # BLD: 0.1 K/UL (ref 0–0.6)
EOSINOPHIL NFR BLD: 2.2 %
EST. AVERAGE GLUCOSE BLD GHB EST-MCNC: 105.4 MG/DL
FERRITIN SERPL IA-MCNC: 405 NG/ML (ref 30–400)
FOLATE SERPL-MCNC: >40 NG/ML (ref 4.78–24.2)
GFR SERPLBLD CREATININE-BSD FMLA CKD-EPI: 63 ML/MIN/{1.73_M2}
GLUCOSE SERPL-MCNC: 98 MG/DL (ref 70–99)
HBA1C MFR BLD: 5.3 %
HCT VFR BLD AUTO: 40.9 % (ref 40.5–52.5)
HDLC SERPL-MCNC: 54 MG/DL (ref 40–60)
HGB BLD-MCNC: 14 G/DL (ref 13.5–17.5)
IRON SATN MFR SERPL: 66 % (ref 20–50)
IRON SERPL-MCNC: 174 UG/DL (ref 59–158)
LDLC SERPL CALC-MCNC: 72 MG/DL
LYMPHOCYTES # BLD: 1.3 K/UL (ref 1–5.1)
LYMPHOCYTES NFR BLD: 20.4 %
MCH RBC QN AUTO: 34.3 PG (ref 26–34)
MCHC RBC AUTO-ENTMCNC: 34.3 G/DL (ref 31–36)
MCV RBC AUTO: 99.8 FL (ref 80–100)
MONOCYTES # BLD: 0.8 K/UL (ref 0–1.3)
MONOCYTES NFR BLD: 13 %
NEUTROPHILS # BLD: 4.1 K/UL (ref 1.7–7.7)
NEUTROPHILS NFR BLD: 63.2 %
PLATELET # BLD AUTO: 341 K/UL (ref 135–450)
PMV BLD AUTO: 8.7 FL (ref 5–10.5)
POTASSIUM SERPL-SCNC: 4.5 MMOL/L (ref 3.5–5.1)
PROT SERPL-MCNC: 7.3 G/DL (ref 6.4–8.2)
PSA SERPL DL<=0.01 NG/ML-MCNC: <0.02 NG/ML (ref 0–4)
RBC # BLD AUTO: 4.1 M/UL (ref 4.2–5.9)
SODIUM SERPL-SCNC: 140 MMOL/L (ref 136–145)
TIBC SERPL-MCNC: 265 UG/DL (ref 260–445)
TRIGL SERPL-MCNC: 70 MG/DL (ref 0–150)
TSH SERPL DL<=0.005 MIU/L-ACNC: 2.31 UIU/ML (ref 0.27–4.2)
VIT B12 SERPL-MCNC: 1217 PG/ML (ref 211–911)
VLDLC SERPL CALC-MCNC: 14 MG/DL
WBC # BLD AUTO: 6.5 K/UL (ref 4–11)

## 2025-03-28 RX ORDER — TOLTERODINE 4 MG/1
CAPSULE, EXTENDED RELEASE ORAL
Qty: 90 CAPSULE | Refills: 1 | Status: SHIPPED | OUTPATIENT
Start: 2025-03-28

## 2025-03-28 ASSESSMENT — ENCOUNTER SYMPTOMS
COLOR CHANGE: 0
SINUS PAIN: 0
BLOOD IN STOOL: 0
CONSTIPATION: 0
COUGH: 0
ABDOMINAL PAIN: 0
CHEST TIGHTNESS: 0
WHEEZING: 0
SHORTNESS OF BREATH: 0

## 2025-03-28 NOTE — PROGRESS NOTES
Medicare Annual Wellness Visit    Randy Sanchez is here for Medicare AWV    Assessment & Plan   Medicare annual wellness visit, subsequent  -     CBC with Auto Differential; Future  -     Comprehensive Metabolic Panel; Future  Mixed stress and urge urinary incontinence  -     tolterodine (DETROL LA) 4 MG extended release capsule; TAKE 1 CAPSULE DAILY, Disp-90 capsule, R-1Normal  Paroxysmal atrial fibrillation (HCC)  -     TSH; Future  Prostate cancer (HCC)  -     PSA, Prostatic Specific Antigen; Future  Stage 3 chronic kidney disease, unspecified whether stage 3a or 3b CKD (HCC)  Screening for cholesterol level  -     Lipid Panel; Future  Hyperglycemia  -     Hemoglobin A1C; Future  Iron deficiency anemia secondary to inadequate dietary iron intake  -     Iron and TIBC; Future  -     Ferritin; Future  -     Vitamin B12 & Folate; Future     Return in 6 months (on 9/28/2025).     Subjective   The following acute and/or chronic problems were also addressed today:  As above    Patient's complete Health Risk Assessment and screening values have been reviewed and are found in Flowsheets. The following problems were reviewed today and where indicated follow up appointments were made and/or referrals ordered.    Positive Risk Factor Screenings with Interventions:                Abnormal BMI (obese):  Body mass index is 32.14 kg/m². (!) Abnormal  Interventions:  low carbohydrate diet             Advanced Directives:  Do you have a Living Will?: (!) (Patient-Rptd) No    Intervention:  has NO advanced directive - information provided                     Objective   Vitals:    03/28/25 0945   BP: 126/82   Pulse: 64   SpO2: 97%   Weight: 101.6 kg (224 lb)   Height: 1.778 m (5' 10\")      Body mass index is 32.14 kg/m².        General Appearance: alert and oriented to person, place and time, well developed and well- nourished, in no acute distress  Skin: warm and dry, no rash or erythema  Head: normocephalic and  Your presenting urinary symptoms, penile discomfort nonspecific.  On exam no acute findings.  Based on urinary symptoms, explained likely nonspecific urethritis, recommend to start taking antibiotic as prescribed, possible side effects were discussed.  Also based on your recent history, being treated for yeast infection recommend to start using topical Lotrisone for few days as was prescribed by urologist in the recent past.  Continue routine care, diet, liquids as tolerated.  Follow with primary care physician or come back if not better in few days or sooner if worsening.

## 2025-04-02 ENCOUNTER — RESULTS FOLLOW-UP (OUTPATIENT)
Dept: INTERNAL MEDICINE CLINIC | Age: 75
End: 2025-04-02

## 2025-04-06 ENCOUNTER — PATIENT MESSAGE (OUTPATIENT)
Dept: INTERNAL MEDICINE CLINIC | Age: 75
End: 2025-04-06

## 2025-04-16 NOTE — PLAN OF CARE
[facility] [address], [place] [address] office: [phone] fax: [phone]     Physical Therapy Initial Evaluation Certification      Dear Page Randolph MD,    We had the pleasure of evaluating the following patient for physical therapy services at [facility].  A summary of our findings can be found in the initial assessment below.  This includes our plan of care.  If you have any questions or concerns regarding these findings, please do not hesitate to contact me at the office phone number listed above.  Thank you for the referral.     Physician Signature:_______________________________Date:__________________  By signing above (or electronic signature), therapist’s plan is approved by physician       Physical Therapy: TREATMENT/PROGRESS NOTE   Patient: Randy Sanchez (74 y.o. male)   Examination Date: 10/07/2024   :  1950 MRN: [id number]   Visit #: 1 /? (Awaiting auth)  Insurance Allowable Auth Needed   BMN [x]Yes - [employer]    []No    Insurance: Payor: [facility] MEDICARE / Plan: ANTHEM MEDIBLUE ESSENTIAL/PLUS / Product Type: *No Product type* /   Insurance ID: [id number] - (Medicare Managed)  Secondary Insurance (if applicable):    Treatment Diagnosis: -    ICD-10-CM    1. Difficulty walking  R26.2       2. Weakness of left lower extremity  R29.898          Medical Diagnosis:  No admission diagnoses are documented for this encounter.   Referring Physician: Page Randolph MD  PCP: Colby Stevens MD     Plan of care signed (Y/N):     Date of Patient follow up with Physician:      Plan of Care Report: EVAL today  POC update due: (10 visits /OR AUTH LIMITS, whichever is less) - 2024                                             Medical History:  Comorbidities:  Other: A-fib, HTN, Hx of mild CP  Relevant Medical History: Mild CP                                         Precautions/ Contra-indications:  [Parents] : parents [___ stools per day] : [unfilled]  stools per day [Normal] : Normal [Brushing teeth] : Brushing teeth [Yes] : Patient goes to dentist yearly [Vitamin] : Primary Fluoride Source: Vitamin [In nursery school] : In nursery school [No] : Not at  exposure [Car seat in back seat] : Car seat in back seat [FreeTextEntry7] : Patient has been well [de-identified] : Regular diet

## 2025-05-05 RX ORDER — CARVEDILOL 12.5 MG/1
TABLET ORAL
Qty: 90 TABLET | Refills: 1 | Status: SHIPPED | OUTPATIENT
Start: 2025-05-05

## 2025-05-05 NOTE — TELEPHONE ENCOUNTER
Received refill request for  carvedilol (COREG) 12.5 MG tablet  from [facility] pharmacy.     Last OV: 03/28/25    Next OV: 10/03/25    Last Labs: 03/28/25    Last Filled: 02/14/25

## 2025-07-24 DIAGNOSIS — I48.0 PAROXYSMAL ATRIAL FIBRILLATION (HCC): ICD-10-CM

## 2025-07-24 RX ORDER — APIXABAN 5 MG/1
5 TABLET, FILM COATED ORAL 2 TIMES DAILY
Qty: 180 TABLET | Refills: 1 | Status: SHIPPED | OUTPATIENT
Start: 2025-07-24

## 2025-07-24 RX ORDER — CARVEDILOL 12.5 MG/1
TABLET ORAL
Qty: 90 TABLET | Refills: 1 | Status: SHIPPED | OUTPATIENT
Start: 2025-07-24

## (undated) DEVICE — BW-412T DISP COMBO CLEANING BRUSH: Brand: SINGLE USE COMBINATION CLEANING BRUSH

## (undated) DEVICE — SOLUTION IRRIG 500ML STRL H2O NONPYROGENIC

## (undated) DEVICE — ENDOSCOPIC KIT 6X3/16 FT COLON W/ 1.1 OZ 2 GWN W/O BRSH

## (undated) DEVICE — AIR/WATER CLEANING ADAPTER FOR OLYMPUS® GI ENDOSCOPE: Brand: BULLDOG®

## (undated) DEVICE — SINGLE USE AIR/WATER, SUCTION AND BIOPSY VALVES SET: Brand: ORCAPOD™